# Patient Record
Sex: MALE | Race: WHITE | NOT HISPANIC OR LATINO | Employment: UNEMPLOYED | URBAN - METROPOLITAN AREA
[De-identification: names, ages, dates, MRNs, and addresses within clinical notes are randomized per-mention and may not be internally consistent; named-entity substitution may affect disease eponyms.]

---

## 2018-09-25 ENCOUNTER — OFFICE VISIT (OUTPATIENT)
Dept: CARDIAC SURGERY | Facility: CLINIC | Age: 63
End: 2018-09-25
Payer: COMMERCIAL

## 2018-09-25 VITALS
TEMPERATURE: 97.8 F | SYSTOLIC BLOOD PRESSURE: 145 MMHG | OXYGEN SATURATION: 99 % | DIASTOLIC BLOOD PRESSURE: 63 MMHG | BODY MASS INDEX: 23.6 KG/M2 | WEIGHT: 174.2 LBS | HEART RATE: 65 BPM | HEIGHT: 72 IN

## 2018-09-25 DIAGNOSIS — K44.9 HIATAL HERNIA: Primary | ICD-10-CM

## 2018-09-25 PROCEDURE — 99205 OFFICE O/P NEW HI 60 MIN: CPT | Performed by: THORACIC SURGERY (CARDIOTHORACIC VASCULAR SURGERY)

## 2018-09-25 RX ORDER — MELATONIN
1000 DAILY
COMMUNITY

## 2018-09-25 NOTE — PROGRESS NOTES
Thoracic Consult  Assessment/Plan:    Hiatal hernia  Mr Jc Jc does have some GI symptoms, although it is less likely to be related to his small hiatal hernia  If he would like to undergo testing to further evaluate his hernia and GERD, this would include esophageal manometry and 24h pH probe  Otherwise, he should continue to follow with his GI physician, as his symptoms may be related to IBS  We have agreed that he will continue on the PPI for at least 6 weeks to see if it improves his symptoms and call our office if he would like to undergo further testing  Diagnoses and all orders for this visit:    Hiatal hernia    Other orders  -     cholecalciferol (VITAMIN D3) 1,000 units tablet; Take 1,000 Units by mouth daily          Thoracic History   Diagnosis: Hiatal hernia    Procedures/Surgeries:    Pathology:    Adjuvant Therapy:          Patient ID: Gino Márquez is a 61 y o  male  HPI    Mr Jc Jc is a 62 yo male referred by Dr Jakub Saleem for a hiatal hernia  He underwent an EGD on 8/16/18 for epigastric abdominal pain by Dr Joe Bennett at [facility]  This demonstrated a small hiatal hernia and zline at 36 cm from incisors  There was also non bleeding erosive gastropathy  His biopsies were negative for intestinal metaplasia or Stoddard's esophagus  His GE junction biopsy was consistent with mild active inflammation, consistent with GERD  On discussion, he was previously treated several years ago for H  Pylori, for 9 months  Over the next several years, he continued with epigastric and right upper quadrant, along with abdominal bloating  He recently started Prilosec, which seemed to relieve the abdominal bloating somewhat  His epigastric pain is constant and doesn't seem to be related to food  RUQ is constant, but epigastric pain is sometimes after eating  He has experienced some liquid/solid regurgitation over the past two weeks   He denies any significant heartburn, odynophagia, dysphagia, nausea, vomiting, globus sensation, or diarrhea  He was a previous 10 ppy smoker, quit 20 yrs ago  He does not recall undergoing esophageal manometry or 24 pH probe testing  He might have undergone a barium swallow within the past 5 years, but not recently  Past Medical History:   Diagnosis Date    Ganglion     Hiatal hernia     Hypogonadism in male     Lump or mass in breast       Past Surgical History:   Procedure Laterality Date    BREAST BIOPSY      OR EXCIS TENDN/CAPSULE LESN,FOOT Left 12/2/2016    Procedure: EXCISION FIBROMA;  Surgeon: Boris Crisostomo DPM;  Location: [facility];  Service: Podiatry   lipoma removed from back and breast    Family History   Problem Relation Age of Onset    No Known Problems Mother     No Known Problems Father       Social History     Social History    Marital status: Single     Spouse name: N/A    Number of children: N/A    Years of education: N/A     Occupational History    Not on file  Social History Main Topics    Smoking status: Former Smoker     Packs/day: 0 50     Years: 20 00     Types: Cigarettes     Quit date: 1998    Smokeless tobacco: Never Used      Comment: socially    Alcohol use Yes      Comment: Rare     Drug use: No    Sexual activity: Not on file     Other Topics Concern    Not on file     Social History Narrative    No narrative on file      Review of Systems   Constitutional: Positive for chills and fever  Negative for fatigue and unexpected weight change  HENT: Positive for congestion and rhinorrhea  Negative for sore throat, trouble swallowing and voice change  Respiratory: Negative for cough and shortness of breath  Cardiovascular: Negative for chest pain  Gastrointestinal: Positive for abdominal distention, abdominal pain and constipation  Negative for diarrhea, nausea and vomiting  Musculoskeletal: Negative for back pain and gait problem  Allergic/Immunologic: Negative for food allergies  Neurological: Negative for syncope and headaches  Hematological: Negative for adenopathy  Psychiatric/Behavioral: Negative for confusion  Objective:   Physical Exam   Constitutional: He is oriented to person, place, and time  He appears well-developed  HENT:   Head: Normocephalic and atraumatic  Eyes: EOM are normal  Pupils are equal, round, and reactive to light  No scleral icterus  Neck: Normal range of motion  Neck supple  Cardiovascular: Normal rate, regular rhythm and normal heart sounds  Pulmonary/Chest: Effort normal  No respiratory distress  He has no wheezes  Abdominal: Soft  Bowel sounds are normal  He exhibits no distension  Musculoskeletal: Normal range of motion  Lymphadenopathy:     He has no cervical adenopathy  Neurological: He is alert and oriented to person, place, and time  No cranial nerve deficit  Skin: Skin is warm and dry  He is not diaphoretic  No pallor  Psychiatric: He has a normal mood and affect  His behavior is normal    Vitals reviewed      /63 (BP Location: Left arm, Patient Position: Sitting, Cuff Size: Adult)   Pulse 65   Temp 97 8 °F (36 6 °C)   Ht 6' (1 829 m)   Wt 79 kg (174 lb 3 2 oz)   SpO2 99%   BMI 23 63 kg/m²

## 2018-09-25 NOTE — ASSESSMENT & PLAN NOTE
Mr Polo Larios does have some GI symptoms, although it is less likely to be related to his small hiatal hernia  If he would like to undergo testing to further evaluate his hernia and GERD, this would include esophageal manometry and 24h pH probe  Otherwise, he should continue to follow with his GI physician, as his symptoms may be related to IBS  We have agreed that he will continue on the PPI for at least 6 weeks to see if it improves his symptoms and call our office if he would like to undergo further testing

## 2018-09-25 NOTE — PROGRESS NOTES
Thoracic Follow-Up  Assessment/Plan:    No problem-specific Assessment & Plan notes found for this encounter  {Assess/PlanSmartLinks:02425}      Thoracic History             Patient ID: Pepe Lindo is a 61 y o  male  HPI        Review of Systems      Objective:   Physical ExamThere were no vitals taken for this visit

## 2021-04-08 ENCOUNTER — OFFICE VISIT (OUTPATIENT)
Dept: INTERNAL MEDICINE CLINIC | Facility: CLINIC | Age: 66
End: 2021-04-08
Payer: COMMERCIAL

## 2021-04-08 VITALS
HEIGHT: 72 IN | SYSTOLIC BLOOD PRESSURE: 122 MMHG | OXYGEN SATURATION: 99 % | HEART RATE: 70 BPM | DIASTOLIC BLOOD PRESSURE: 72 MMHG | WEIGHT: 169.2 LBS | BODY MASS INDEX: 22.92 KG/M2 | RESPIRATION RATE: 16 BRPM

## 2021-04-08 DIAGNOSIS — Z13.29 SCREENING FOR THYROID DISORDER: ICD-10-CM

## 2021-04-08 DIAGNOSIS — Z13.1 SCREENING FOR DIABETES MELLITUS: ICD-10-CM

## 2021-04-08 DIAGNOSIS — Z81.8 FAMILY HISTORY OF FIRST DEGREE RELATIVE WITH DEMENTIA: ICD-10-CM

## 2021-04-08 DIAGNOSIS — E29.1 HYPOGONADISM IN MALE: ICD-10-CM

## 2021-04-08 DIAGNOSIS — Z12.5 SCREENING FOR PROSTATE CANCER: ICD-10-CM

## 2021-04-08 DIAGNOSIS — D17.1 LIPOMA OF TORSO: Primary | ICD-10-CM

## 2021-04-08 DIAGNOSIS — I65.23 CAROTID STENOSIS, ASYMPTOMATIC, BILATERAL: ICD-10-CM

## 2021-04-08 DIAGNOSIS — N40.0 BENIGN PROSTATIC HYPERPLASIA WITHOUT LOWER URINARY TRACT SYMPTOMS: ICD-10-CM

## 2021-04-08 DIAGNOSIS — E55.9 VITAMIN D DEFICIENCY: ICD-10-CM

## 2021-04-08 DIAGNOSIS — Z13.220 SCREENING FOR HYPERLIPIDEMIA: ICD-10-CM

## 2021-04-08 PROCEDURE — 3725F SCREEN DEPRESSION PERFORMED: CPT | Performed by: INTERNAL MEDICINE

## 2021-04-08 PROCEDURE — 3288F FALL RISK ASSESSMENT DOCD: CPT | Performed by: INTERNAL MEDICINE

## 2021-04-08 PROCEDURE — 99204 OFFICE O/P NEW MOD 45 MIN: CPT | Performed by: INTERNAL MEDICINE

## 2021-04-08 PROCEDURE — 1101F PT FALLS ASSESS-DOCD LE1/YR: CPT | Performed by: INTERNAL MEDICINE

## 2021-04-08 RX ORDER — MAGNESIUM GLUCONATE 30 MG(550)
30 TABLET ORAL DAILY
COMMUNITY

## 2021-04-08 NOTE — PROGRESS NOTES
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  -works full time and getting burned out since other family members are not helping  -is interested in home services and social work consulted    #Hearing Loss  -worse in right ear  -has hearing aid and will await replacement    #Health Maintenance  -routine labs and followup 3 months  -owns a financial company  -defers colonoscopy and EGD due 7 colonoscopies in past and 5 EGD in past      Addendum 4211 patient's testosterone level was 430  He reports that he feels fatigued  He states that his normal baseline is typically around 750  He previously was on testosterone 200 mg injection once a month  He states that this helped him and then he discontinued it for short time period because his testosterone normalize  States that he would like to hold off for now however will contact us in 1 month his symptoms of fatigue continue to persist   Patient was informed risk of prostate cancer with testosterone therapy as well as coronary artery risk  He is aware of this  Addendum 4/27/21 lipoma excised  No problem-specific Assessment & Plan notes found for this encounter  Diagnoses and all orders for this visit:    Lipoma of torso  -     CBC and differential  -     Comprehensive metabolic panel  -     Ambulatory referral to General Surgery;  Future    Family history of first degree relative with dementia  -     CBC and differential  -     Comprehensive metabolic panel  -     Ambulatory referral to social work care management program    Carotid stenosis, asymptomatic, bilateral  -     CBC and differential  -     Comprehensive metabolic panel  -     VAS carotid complete study; Future    Screening for hyperlipidemia  -     CBC and differential  -     Comprehensive metabolic panel  -     Lipid Panel With Direct LDL    Screening for thyroid disorder  -     CBC and differential  -     TSH, 3rd generation with Free T4 reflex  -     Comprehensive metabolic panel    Screening for prostate cancer  -     CBC and differential  -     Comprehensive metabolic panel    Vitamin D deficiency  -     CBC and differential  -     Vitamin D 25 hydroxy  -     Comprehensive metabolic panel    Benign prostatic hyperplasia without lower urinary tract symptoms  -     PSA, total and free    Screening for diabetes mellitus  -     Hemoglobin A1C    Hypogonadism in male  -     Testosterone, free, total    Other orders  -     Magnesium Gluconate 550 MG TABS; Take 30 mg by mouth daily  -     co-enzyme Q-10 30 MG capsule; Take 30 mg by mouth 3 (three) times a day            Current Outpatient Medications:     cholecalciferol (VITAMIN D3) 1,000 units tablet, Take 1,000 Units by mouth daily, Disp: , Rfl:     co-enzyme Q-10 30 MG capsule, Take 30 mg by mouth 3 (three) times a day, Disp: , Rfl:     Magnesium Gluconate 550 MG TABS, Take 30 mg by mouth daily, Disp: , Rfl:     Multiple Vitamin (MULTIVITAMIN) capsule, Take 1 capsule by mouth daily, Disp: , Rfl:     Subjective:      Patient ID: Vickidestini Arenas is a 72 y o  male  HPI     Patient presents for patient visit  Reports that he has been under lot of stress since he has been taking care of his mother who has Alzheimer's  Reports that it is difficult because he is working full-time  He would like services to help with caring for his mother and we will refer him to social work  States that he has been feeling fatigued for the past several   We will check a testosterone as well as thyroid and routine labs    Reports that he had low testosterone in past and had been on a supplement however his testosterone and rebounded and he discontinued it  Patient reports he also has a lipoma over his left flank  He is interested having it removed since it is causing him discomfort  We will refer him to surgery for further evaluation  Patient also reports that he has hearing loss over the right ear  This is chronic  He is currently awaiting hearing aids  Patient reports that he is up-to-date on his colonoscopy  He is due for carotid ultrasound we obtain this  He defers a stress test   Patient was former smoker however quit over 20 years ago  He denies any alcohol or drug use  He is up-to-date on the COVID vaccine and will receive his 2nd dose over the next couple weeks  Reports that his sister has lung cancer and mom has emphysema and both mom and dad hypertension  He reports that his father had coronary artery disease however he was a smoker and worked in the [address] [place] [address] Node[address]  Patient will return to care in 3 months with labs  The following portions of the patient's history were reviewed and updated as appropriate: allergies, current medications, past family history, past medical history, past social history, past surgical history and problem list     Review of Systems   Constitutional: Positive for fatigue  Negative for activity change, appetite change and fever  HENT: Negative for congestion, ear pain, hearing loss, sore throat and tinnitus  Eyes: Negative for photophobia, pain and visual disturbance  Respiratory: Negative for cough, shortness of breath and wheezing  Cardiovascular: Negative for chest pain and leg swelling  Gastrointestinal: Negative for abdominal distention, abdominal pain, constipation, diarrhea, nausea and vomiting  Genitourinary: Negative for difficulty urinating, frequency and hematuria     Musculoskeletal: Negative for arthralgias, back pain, gait problem, joint swelling, myalgias, neck pain and neck stiffness  Skin: Positive for rash (lump on left back)  Negative for color change, pallor and wound  Neurological: Negative for dizziness, tremors, numbness and headaches  Hematological: Does not bruise/bleed easily  Psychiatric/Behavioral: Positive for dysphoric mood  Objective:      /72   Pulse 70   Resp 16   Ht 6' (1 829 m)   Wt 76 7 kg (169 lb 3 2 oz)   SpO2 99%   BMI 22 95 kg/m²          Physical Exam  Vitals signs reviewed  Constitutional:       Appearance: Normal appearance  He is well-developed  HENT:      Head: Normocephalic and atraumatic  Right Ear: Tympanic membrane, ear canal and external ear normal  There is no impacted cerumen  Left Ear: Tympanic membrane, ear canal and external ear normal  There is no impacted cerumen  Eyes:      Conjunctiva/sclera: Conjunctivae normal       Pupils: Pupils are equal, round, and reactive to light  Neck:      Musculoskeletal: Normal range of motion and neck supple  Thyroid: No thyromegaly  Vascular: No JVD  Cardiovascular:      Rate and Rhythm: Normal rate and regular rhythm  Pulses: Normal pulses  Heart sounds: Normal heart sounds  No murmur  Pulmonary:      Effort: Pulmonary effort is normal  No respiratory distress  Breath sounds: Normal breath sounds  No stridor  No wheezing, rhonchi or rales  Abdominal:      General: Abdomen is flat  Bowel sounds are normal  There is no distension  Palpations: Abdomen is soft  There is no mass  Tenderness: There is no abdominal tenderness  There is no guarding or rebound  Musculoskeletal: Normal range of motion  General: Tenderness (left flank lipoma) present  No deformity  Right lower leg: No edema  Left lower leg: No edema  Lymphadenopathy:      Cervical: No cervical adenopathy  Skin:     General: Skin is warm and dry  Findings: No erythema or rash     Neurological:      Mental Status: He is alert and oriented to person, place, and time  Mental status is at baseline  Deep Tendon Reflexes: Reflexes are normal and symmetric  Psychiatric:         Behavior: Behavior normal          Thought Content: Thought content normal          Judgment: Judgment normal       Comments: Depressed mood           This note was completed in part utilizing m-[employer] fluency direct voice recognition software  Grammatical errors, random word insertion, spelling mistakes, and incomplete sentences may be an occasional consequence of the system secondary to software limitations, ambient noise and hardware issues  At the time of dictation, efforts were made to edit, clarify and /or correct errors  Please read the chart carefully and recognize, using context, where substitutions have occurred  If you have any questions or concerns about the context, text or information contained within the body of this dictation, please contact myself, the provider, for further clarification

## 2021-04-09 ENCOUNTER — PATIENT OUTREACH (OUTPATIENT)
Dept: INTERNAL MEDICINE CLINIC | Facility: CLINIC | Age: 66
End: 2021-04-09

## 2021-04-09 NOTE — PROGRESS NOTES
Referral received from Dr Anjel Jeter with request for 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

## 2021-04-13 ENCOUNTER — PATIENT OUTREACH (OUTPATIENT)
Dept: INTERNAL MEDICINE CLINIC | Facility: CLINIC | Age: 66
End: 2021-04-13

## 2021-04-13 NOTE — PROGRESS NOTES
Voicemail received from patient in response to [facility] SW's initial outreach  [facility] SW to return call to patient  Outreached patient to further assess needs (patient's mother has dementia and is in need of HHA assistance per Dr Madhuri Bill)  Patient stated he had recent PCP appt but AVS had incorrect insurance information listed (patient had [id number] but now has [employer])  [facility] SW confirmed for patient that insurance information has been updated to show new insurance plan ([facility])  Patient is due for lab work and plans to go to [facility] for this; patient has questions regarding script for VAS Carotid and needed Ultrasound  Will send message to PCP office clinical staff and request for staff to outreach patient to further clarify  Patient also confirmed that he is in need of resources for his mother  Patient's mother is not a [facility] patient; sees [facility]  Patient's mother lives with him; she has dementia  Patient works full-time and is not home during the day; checks on his mom during his lunch break  Patient stated his mother does fine on her own on good days  Patient's mom uses cane to ambulate, had fall 2 years ago that led to progressive decline, has history of VNA, does not drive (patient transports to appts), is blind in 1 eye and deaf in 1 ear (has hearing aid in other ear), patient cooks 90% of meals, patient's mom prepares her own breakfast (fruit or egg), patient's mom dresses herself, and does her own medications through pill box but patient monitors  Patient does not want to place patient in facility for long-term care but expressed interest in VNA for home PT/OT  [facility] SW explained that patient's mother's PCP office will need to place this HHC order directly; [facility] SW encouraged patient to outreach his mother's PCP office to further discuss  Patient also expressed possible interest in in-home services 1-2 times per week and food services to help with meals    Patient's mother does not have assets per patient  [facility] SW will mail patient resources to review for any future needs (Waiver, [address] list, [employer] information, [employer] information, [employer], Meals on [employer], Progression, and [address])  Patient will call [facility] SW with any questions

## 2021-04-19 DIAGNOSIS — E78.2 MODERATE MIXED HYPERLIPIDEMIA NOT REQUIRING STATIN THERAPY: Primary | ICD-10-CM

## 2021-04-20 ENCOUNTER — TELEPHONE (OUTPATIENT)
Dept: INTERNAL MEDICINE CLINIC | Facility: CLINIC | Age: 66
End: 2021-04-20

## 2021-04-20 LAB
25(OH)D3 SERPL-MCNC: 27 NG/ML (ref 30–100)
ALBUMIN SERPL-MCNC: 4.3 G/DL (ref 3.6–5.1)
ALBUMIN/GLOB SERPL: 1.5 (CALC) (ref 1–2.5)
ALP SERPL-CCNC: 83 U/L (ref 35–144)
ALT SERPL-CCNC: 18 U/L (ref 9–46)
AST SERPL-CCNC: 17 U/L (ref 10–35)
BASOPHILS # BLD AUTO: 71 CELLS/UL (ref 0–200)
BASOPHILS NFR BLD AUTO: 1 %
BILIRUB SERPL-MCNC: 0.4 MG/DL (ref 0.2–1.2)
BUN SERPL-MCNC: 28 MG/DL (ref 7–25)
BUN/CREAT SERPL: 25 (CALC) (ref 6–22)
CALCIUM SERPL-MCNC: 9.4 MG/DL (ref 8.6–10.3)
CHLORIDE SERPL-SCNC: 104 MMOL/L (ref 98–110)
CHOLEST SERPL-MCNC: 189 MG/DL
CHOLEST/HDLC SERPL: 4.5 (CALC)
CO2 SERPL-SCNC: 27 MMOL/L (ref 20–32)
CREAT SERPL-MCNC: 1.1 MG/DL (ref 0.7–1.25)
EOSINOPHIL # BLD AUTO: 128 CELLS/UL (ref 15–500)
EOSINOPHIL NFR BLD AUTO: 1.8 %
ERYTHROCYTE [DISTWIDTH] IN BLOOD BY AUTOMATED COUNT: 13.6 % (ref 11–15)
GLOBULIN SER CALC-MCNC: 2.8 G/DL (CALC) (ref 1.9–3.7)
GLUCOSE SERPL-MCNC: 106 MG/DL (ref 65–99)
HCT VFR BLD AUTO: 50.1 % (ref 38.5–50)
HDLC SERPL-MCNC: 42 MG/DL
HGB BLD-MCNC: 17 G/DL (ref 13.2–17.1)
LDLC SERPL CALC-MCNC: 118 MG/DL (CALC)
LYMPHOCYTES # BLD AUTO: 1590 CELLS/UL (ref 850–3900)
LYMPHOCYTES NFR BLD AUTO: 22.4 %
MCH RBC QN AUTO: 32 PG (ref 27–33)
MCHC RBC AUTO-ENTMCNC: 33.9 G/DL (ref 32–36)
MCV RBC AUTO: 94.2 FL (ref 80–100)
MONOCYTES # BLD AUTO: 795 CELLS/UL (ref 200–950)
MONOCYTES NFR BLD AUTO: 11.2 %
NEUTROPHILS # BLD AUTO: 4516 CELLS/UL (ref 1500–7800)
NEUTROPHILS NFR BLD AUTO: 63.6 %
NONHDLC SERPL-MCNC: 147 MG/DL (CALC)
PLATELET # BLD AUTO: 237 THOUSAND/UL (ref 140–400)
PMV BLD REES-ECKER: 11.1 FL (ref 7.5–12.5)
POTASSIUM SERPL-SCNC: 4.5 MMOL/L (ref 3.5–5.3)
PROT SERPL-MCNC: 7.1 G/DL (ref 6.1–8.1)
PSA FREE MFR SERPL: 20 % (CALC)
PSA FREE SERPL-MCNC: 0.1 NG/ML
PSA SERPL-MCNC: 0.5 NG/ML
RBC # BLD AUTO: 5.32 MILLION/UL (ref 4.2–5.8)
SL AMB EGFR AFRICAN AMERICAN: 81 ML/MIN/1.73M2
SL AMB EGFR NON AFRICAN AMERICAN: 70 ML/MIN/1.73M2
SODIUM SERPL-SCNC: 138 MMOL/L (ref 135–146)
TESTOST FREE SERPL-MCNC: 38.3 PG/ML (ref 35–155)
TESTOST SERPL-MCNC: 430 NG/DL (ref 250–1100)
TRIGL SERPL-MCNC: 174 MG/DL
TSH SERPL-ACNC: 2.5 MIU/L (ref 0.4–4.5)
WBC # BLD AUTO: 7.1 THOUSAND/UL (ref 3.8–10.8)

## 2021-04-20 NOTE — TELEPHONE ENCOUNTER
Called patient and gave results  Ge didn't want to make an appointment until his other test results were back

## 2021-04-20 NOTE — TELEPHONE ENCOUNTER
----- Message from Annamae Lanes, DO sent at 4/19/2021  5:47 PM EDT -----  Please let patient know his labs look good  PSA was normal, no issues with prostate cancer, his LDL cholesterol was 118 and triglyceride was 174  We will need him to diet and exercise and cut back on alcohol and fatty foods  We will repeat his labs i  n 6 months and he should come back at that time  His testosterone is pending

## 2021-04-20 NOTE — TELEPHONE ENCOUNTER
Please let patient know his testosterone was normal at 430      PER DR FREEDMAN     Called patient and left messsage asking him to call us back so we can set up appointment

## 2021-04-21 ENCOUNTER — TELEPHONE (OUTPATIENT)
Dept: INTERNAL MEDICINE CLINIC | Facility: CLINIC | Age: 66
End: 2021-04-21

## 2021-04-21 DIAGNOSIS — E29.1 HYPOGONADISM IN MALE: Primary | ICD-10-CM

## 2021-04-21 NOTE — TELEPHONE ENCOUNTER
Gr[facility] Braswell would like to talk to you about his testosterone results  His level shows 430 done on 4/16  He says his level is usually in the 700 range and would like to speak with you

## 2021-04-26 ENCOUNTER — CONSULT (OUTPATIENT)
Dept: SURGERY | Facility: CLINIC | Age: 66
End: 2021-04-26
Payer: COMMERCIAL

## 2021-04-26 VITALS — HEIGHT: 72 IN | WEIGHT: 170 LBS | BODY MASS INDEX: 23.03 KG/M2

## 2021-04-26 DIAGNOSIS — D17.1 LIPOMA OF TORSO: ICD-10-CM

## 2021-04-26 PROCEDURE — 99202 OFFICE O/P NEW SF 15 MIN: CPT | Performed by: SURGERY

## 2021-04-26 PROCEDURE — 3008F BODY MASS INDEX DOCD: CPT | Performed by: SURGERY

## 2021-04-26 PROCEDURE — 1160F RVW MEDS BY RX/DR IN RCRD: CPT | Performed by: SURGERY

## 2021-04-26 PROCEDURE — 88304 TISSUE EXAM BY PATHOLOGIST: CPT | Performed by: PATHOLOGY

## 2021-04-26 PROCEDURE — 1036F TOBACCO NON-USER: CPT | Performed by: SURGERY

## 2021-04-26 PROCEDURE — 11404 EXC TR-EXT B9+MARG 3.1-4 CM: CPT | Performed by: SURGERY

## 2021-04-26 NOTE — PROGRESS NOTES
Assessment/Plan:  Patient presents with subcutaneous mass over the left  Flank  Is been enlarging in size  He desires excision for definitive diagnosis  After consultation he was turned to the  Right lateral decubitus position  The area was prepped with alcohol and draped in a sterile fashion  1% lidocaine with epinephrine was infiltrated circumferentially  After consultation incision was made and carried down through skin and subcutaneous tissue  The lipoma had replaced most of the  Subcutaneous soft tissue down to the musculature  This mass was then excised  It measures 3 5 by 2 5 cm  Margins are 0  The wound was reapproximated with 3 0 Vicryl subcutaneous and 4 Monocryl suture  Dermabond was then applied with Steri-Strips  He tolerated this procedure well  Wound care instructions provided  Diagnoses and all orders for this visit:    Lipoma of torso  -     Ambulatory referral to General Surgery  -     Tissue Exam; Future  -     Tissue Exam    Other orders  -     Skin excision        Subjective:      Patient ID: Darene Gilford is a 72 y o  male  Patient presents for evaluation of a lump on his left flank  States he has had the lump for 1 1/2 years and was scheduled to have the lump removed  The lump disappeared and surgery was cancelled  He noticed the lump again 3 months ago  Denies pain or size change  The following portions of the patient's history were reviewed and updated as appropriate:     He  has a past medical history of Ganglion, Hiatal hernia, Hypogonadism in male, and Lump or mass in breast   He  has a past surgical history that includes Breast biopsy and pr excis tendn/capsule lesn,foot (Left, 12/2/2016)  His family history includes COPD in his father and mother; Coronary artery disease in his father; Dementia in his mother; Hypertension in his father and mother; Lung cancer in his sister  He  reports that he quit smoking about 23 years ago   His smoking use included cigarettes  He has a 10 00 pack-year smoking history  He has never used smokeless tobacco  He reports previous alcohol use  He reports that he does not use drugs  Current Outpatient Medications   Medication Sig Dispense Refill    cholecalciferol (VITAMIN D3) 1,000 units tablet Take 1,000 Units by mouth daily      co-enzyme Q-10 30 MG capsule Take 30 mg by mouth 3 (three) times a day      Magnesium Gluconate 550 MG TABS Take 30 mg by mouth daily      Multiple Vitamin (MULTIVITAMIN) capsule Take 1 capsule by mouth daily       No current facility-administered medications for this visit  He has No Known Allergies       Review of Systems   HENT: Positive for hearing loss  All other systems reviewed and are negative  Objective:      Ht 6' (1 829 m)   Wt 77 1 kg (170 lb)   BMI 23 06 kg/m²          Physical Exam  Skin excision    Date/Time: 4/26/2021 6:26 PM  Performed by: Nolan Frey MD  Authorized by: Nolan Frey MD   Universal Protocol:  Consent: Verbal consent obtained  Patient understanding: patient states understanding of the procedure being performed  Patient identity confirmed: verbally with patient      Procedure Details - Skin Excision:     Number of Lesions:  1  Lesion 1:     Body area:  Trunk    Trunk location:  L flank       Final defect size (mm):  35    Malignancy: benign lesion       Repair Comments: After consultation incision was made and carried down through skin and subcutaneous tissue  The lipoma had replaced most of the  Subcutaneous soft tissue down to the musculature  This mass was then excised  It measures 3 5 by 2 5 cm  Margins are 0  The wound was reapproximated with 3 0 Vicryl subcutaneous and 4 Monocryl suture  Dermabond was then applied with Steri-Strips  He tolerated this procedure well  Wound care instructions provided

## 2021-05-03 ENCOUNTER — OFFICE VISIT (OUTPATIENT)
Dept: SURGERY | Facility: CLINIC | Age: 66
End: 2021-05-03

## 2021-05-03 DIAGNOSIS — D17.1 LIPOMA OF TORSO: Primary | ICD-10-CM

## 2021-05-03 PROCEDURE — 99024 POSTOP FOLLOW-UP VISIT: CPT | Performed by: SURGERY

## 2021-05-03 NOTE — PROGRESS NOTES
Assessment/Plan:  Patient presents for follow-up visit  He underwent excision of the lipoma over the left flank  Pathology is benign  He returns today for follow-up visit  Incisions clean and healing well  Is no evidence for infection  Questions were answered  No follow-up visit is required  Diagnoses and all orders for this visit:    Lipoma of torso        Subjective:      Patient ID: Nette Navarrete is a 72 y o  male  Patient presents for follow up on SQ mass over his left flank  S/P excision of SQ mass over the left flank 4/26/2021  Final Diagnosis   A  Soft tissue, left flank, excision:        - Benign adipose tissue, consistent with lipoma  - No dysplasia or malignancy is identified  The following portions of the patient's history were reviewed and updated as appropriate:     He  has a past medical history of Ganglion, Hiatal hernia, Hypogonadism in male, and Lump or mass in breast   He  has a past surgical history that includes Breast biopsy and pr excis tendn/capsule lesn,foot (Left, 12/2/2016)  His family history includes COPD in his father and mother; Coronary artery disease in his father; Dementia in his mother; Hypertension in his father and mother; Lung cancer in his sister  He  reports that he quit smoking about 23 years ago  His smoking use included cigarettes  He has a 10 00 pack-year smoking history  He has never used smokeless tobacco  He reports previous alcohol use  He reports that he does not use drugs  Current Outpatient Medications   Medication Sig Dispense Refill    cholecalciferol (VITAMIN D3) 1,000 units tablet Take 1,000 Units by mouth daily      co-enzyme Q-10 30 MG capsule Take 30 mg by mouth 3 (three) times a day      Magnesium Gluconate 550 MG TABS Take 30 mg by mouth daily      Multiple Vitamin (MULTIVITAMIN) capsule Take 1 capsule by mouth daily       No current facility-administered medications for this visit        He has No Known Allergies       Review of Systems   HENT: Positive for hearing loss  All other systems reviewed and are negative  Objective: There were no vitals taken for this visit  Physical Exam  incision is clean and healing well  No evidence for infection

## 2021-06-02 ENCOUNTER — HOSPITAL ENCOUNTER (OUTPATIENT)
Dept: NON INVASIVE DIAGNOSTICS | Facility: CLINIC | Age: 66
Discharge: HOME | End: 2021-06-02
Payer: COMMERCIAL

## 2021-06-02 DIAGNOSIS — I65.23 CAROTID STENOSIS, ASYMPTOMATIC, BILATERAL: ICD-10-CM

## 2021-06-02 PROCEDURE — 93880 EXTRACRANIAL BILAT STUDY: CPT | Performed by: SURGERY

## 2021-06-02 PROCEDURE — 93880 EXTRACRANIAL BILAT STUDY: CPT

## 2021-06-07 ENCOUNTER — TELEPHONE (OUTPATIENT)
Dept: INTERNAL MEDICINE CLINIC | Facility: CLINIC | Age: 66
End: 2021-06-07

## 2021-06-07 NOTE — TELEPHONE ENCOUNTER
----- Message from Erika Blanco DO sent at 6/3/2021  3:03 PM EDT -----  Please let patient know his carotid artery was fine  He has mild cholesterol deposit there in both sides but it is less than 50%, all good news

## 2021-08-14 LAB
ALBUMIN SERPL-MCNC: 4.1 G/DL (ref 3.6–5.1)
ALBUMIN/GLOB SERPL: 1.5 (CALC) (ref 1–2.5)
ALP SERPL-CCNC: 69 U/L (ref 35–144)
ALT SERPL-CCNC: 17 U/L (ref 9–46)
AST SERPL-CCNC: 16 U/L (ref 10–35)
BILIRUB SERPL-MCNC: 0.5 MG/DL (ref 0.2–1.2)
BUN SERPL-MCNC: 22 MG/DL (ref 7–25)
BUN/CREAT SERPL: ABNORMAL (CALC) (ref 6–22)
CALCIUM SERPL-MCNC: 9.4 MG/DL (ref 8.6–10.3)
CHLORIDE SERPL-SCNC: 105 MMOL/L (ref 98–110)
CHOLEST SERPL-MCNC: 192 MG/DL
CHOLEST/HDLC SERPL: 4.7 (CALC)
CO2 SERPL-SCNC: 29 MMOL/L (ref 20–32)
CREAT SERPL-MCNC: 1.03 MG/DL (ref 0.7–1.25)
GLOBULIN SER CALC-MCNC: 2.8 G/DL (CALC) (ref 1.9–3.7)
GLUCOSE SERPL-MCNC: 111 MG/DL (ref 65–99)
HDLC SERPL-MCNC: 41 MG/DL
LDLC SERPL CALC-MCNC: 120 MG/DL (CALC)
NONHDLC SERPL-MCNC: 151 MG/DL (CALC)
POTASSIUM SERPL-SCNC: 4.1 MMOL/L (ref 3.5–5.3)
PROT SERPL-MCNC: 6.9 G/DL (ref 6.1–8.1)
SL AMB EGFR AFRICAN AMERICAN: 87 ML/MIN/1.73M2
SL AMB EGFR NON AFRICAN AMERICAN: 75 ML/MIN/1.73M2
SODIUM SERPL-SCNC: 140 MMOL/L (ref 135–146)
TESTOST FREE SERPL-MCNC: 58.4 PG/ML (ref 35–155)
TESTOST SERPL-MCNC: 578 NG/DL (ref 250–1100)
TRIGL SERPL-MCNC: 195 MG/DL

## 2021-08-20 ENCOUNTER — RA CDI HCC (OUTPATIENT)
Dept: OTHER | Facility: HOSPITAL | Age: 66
End: 2021-08-20

## 2021-08-20 NOTE — PROGRESS NOTES
[facility]       Chart reviewed, no opportunity found: CHART REVIEWED, NO OPPORTUNITY FOUND                        Patients insurance company: [employer]

## 2021-08-27 ENCOUNTER — OFFICE VISIT (OUTPATIENT)
Dept: INTERNAL MEDICINE CLINIC | Facility: CLINIC | Age: 66
End: 2021-08-27
Payer: COMMERCIAL

## 2021-08-27 VITALS
WEIGHT: 172.6 LBS | HEART RATE: 69 BPM | HEIGHT: 72 IN | OXYGEN SATURATION: 99 % | BODY MASS INDEX: 23.38 KG/M2 | RESPIRATION RATE: 16 BRPM | DIASTOLIC BLOOD PRESSURE: 84 MMHG | SYSTOLIC BLOOD PRESSURE: 126 MMHG

## 2021-08-27 DIAGNOSIS — E29.1 HYPOGONADISM IN MALE: Primary | ICD-10-CM

## 2021-08-27 DIAGNOSIS — E78.2 MODERATE MIXED HYPERLIPIDEMIA NOT REQUIRING STATIN THERAPY: ICD-10-CM

## 2021-08-27 DIAGNOSIS — Z20.822 ENCOUNTER FOR LABORATORY TESTING FOR COVID-19 VIRUS: ICD-10-CM

## 2021-08-27 DIAGNOSIS — Z00.00 MEDICARE ANNUAL WELLNESS VISIT, INITIAL: ICD-10-CM

## 2021-08-27 PROCEDURE — 1170F FXNL STATUS ASSESSED: CPT | Performed by: INTERNAL MEDICINE

## 2021-08-27 PROCEDURE — 1125F AMNT PAIN NOTED PAIN PRSNT: CPT | Performed by: INTERNAL MEDICINE

## 2021-08-27 PROCEDURE — G0438 PPPS, INITIAL VISIT: HCPCS | Performed by: INTERNAL MEDICINE

## 2021-08-27 PROCEDURE — 3725F SCREEN DEPRESSION PERFORMED: CPT | Performed by: INTERNAL MEDICINE

## 2021-08-27 PROCEDURE — 99214 OFFICE O/P EST MOD 30 MIN: CPT | Performed by: INTERNAL MEDICINE

## 2021-08-27 PROCEDURE — 1036F TOBACCO NON-USER: CPT | Performed by: INTERNAL MEDICINE

## 2021-08-27 PROCEDURE — 3008F BODY MASS INDEX DOCD: CPT | Performed by: INTERNAL MEDICINE

## 2021-08-27 PROCEDURE — 3288F FALL RISK ASSESSMENT DOCD: CPT | Performed by: INTERNAL MEDICINE

## 2021-08-27 PROCEDURE — 1160F RVW MEDS BY RX/DR IN RCRD: CPT | Performed by: INTERNAL MEDICINE

## 2021-08-27 RX ORDER — TESTOSTERONE 16.2 MG/G
81 GEL TRANSDERMAL EVERY MORNING
Qty: 75 G | Refills: 1 | Status: SHIPPED | OUTPATIENT
Start: 2021-08-27 | End: 2022-02-24

## 2021-08-27 NOTE — PROGRESS NOTES
Assessment and Plan:     Problem List Items Addressed This Visit     None      Visit Diagnoses     Hypogonadism in male    -  Primary    Relevant Medications    testosterone (AndroGel Pump) 1 62 % TD gel pump    Other Relevant Orders    Testosterone, free, total    Moderate mixed hyperlipidemia not requiring statin therapy        Relevant Orders    Lipid Panel With Direct LDL    Encounter for laboratory testing for COVID-19 virus        Relevant Orders    SARS-CoV2 Antibody, Total (IgG, IgA, IgM) [facility]           Preventive health issues were discussed with patient, and age appropriate screening tests were ordered as noted in patient's After Visit Summary  Personalized health advice and appropriate referrals for health education or preventive services given if needed, as noted in patient's After Visit Summary       History of Present Illness:     Patient presents for Medicare Annual Wellness visit    Patient Care Team:  Francheska Damon DO as PCP - General (Internal Medicine)     Problem List:     Patient Active Problem List   Diagnosis    Hiatal hernia    Lipoma of torso      Past Medical and Surgical History:     Past Medical History:   Diagnosis Date    Ganglion     Hiatal hernia     Hypogonadism in male     Lump or mass in breast      Past Surgical History:   Procedure Laterality Date    BREAST BIOPSY      NV EXCIS TENDN/CAPSULE LESN,FOOT Left 12/2/2016    Procedure: EXCISION FIBROMA;  Surgeon: Devonte France DPM;  Location: [facility];  Service: Podiatry      Family History:     Family History   Problem Relation Age of Onset    Dementia Mother     COPD Mother     Hypertension Mother     Coronary artery disease Father    [facility] COPD Father     Hypertension Father     Lung cancer Sister       Social History:     Social History     Socioeconomic History    Marital status: Single     Spouse name: None    Number of children: None    Years of education: None    Highest education level: None   Occupational History    None   Tobacco Use    Smoking status: Former Smoker     Packs/day: 0 50     Years: 20 00     Pack years: 10 00     Types: Cigarettes     Quit date:      Years since quittin 6    Smokeless tobacco: Never Used    Tobacco comment: socially   Vaping Use    Vaping Use: Never used   Substance and Sexual Activity    Alcohol use: Not Currently     Comment: Rare     Drug use: Never    Sexual activity: None   Other Topics Concern    None   Social History Narrative    None     Social Determinants of Health     Financial Resource Strain:     Difficulty of Paying Living Expenses:    Food Insecurity:     Worried About Running Out of Food in the Last Year:     Ran Out of Food in the Last Year:    Transportation Needs:     Lack of Transportation (Medical):      Lack of Transportation (Non-Medical):    Physical Activity:     Days of Exercise per Week:     Minutes of Exercise per Session:    Stress:     Feeling of Stress :    Social Connections:     Frequency of Communication with Friends and Family:     Frequency of Social Gatherings with Friends and Family:     Attends Zoroastrianism Services:     Active Member of Clubs or Organizations:     Attends Club or Organization Meetings:     Marital Status:    Intimate Partner Violence:     Fear of Current or Ex-Partner:     Emotionally Abused:     Physically Abused:     Sexually Abused:       Medications and Allergies:     Current Outpatient Medications   Medication Sig Dispense Refill    cholecalciferol (VITAMIN D3) 1,000 units tablet Take 1,000 Units by mouth daily      co-enzyme Q-10 30 MG capsule Take 30 mg by mouth 3 (three) times a day      Magnesium Gluconate 550 MG TABS Take 30 mg by mouth daily      Multiple Vitamin (MULTIVITAMIN) capsule Take 1 capsule by mouth daily      testosterone (AndroGel Pump) 1 62 % TD gel pump Apply 4 actuation (81 mg total) topically every morning 75 g 1     No current facility-administered medications for this visit  No Known Allergies   Immunizations:     Immunization History   Administered Date(s) Administered    SARS-CoV-2 / COVID-19 mRNA IM (Pfizer-BioNTech) 03/31/2021, 04/21/2021    Tdap 08/27/2019      Health Maintenance:         Topic Date Due    Hepatitis C Screening  Never done    Colorectal Cancer Screening  Never done         Topic Date Due    Influenza Vaccine (1) 09/01/2021      Medicare Health Risk Assessment:     /84   Pulse 69   Resp 16   Ht 6' (1 829 m)   Wt 78 3 kg (172 lb 9 6 oz)   SpO2 99%   BMI 23 41 kg/m²      Casey Sanchez is here for his Initial Wellness visit  Last Medicare Wellness visit information reviewed, patient interviewed and updates made to the record today  Health Risk Assessment:   Patient rates overall health as good  Patient feels that their physical health rating is same  Patient is satisfied with their life  Eyesight was rated as same  Hearing was rated as slightly worse  Patient feels that their emotional and mental health rating is slightly better  Patients states they are never, rarely angry  Patient states they are often unusually tired/fatigued  Pain experienced in the last 7 days has been none  Patient states that he has experienced no weight loss or gain in last 6 months  Depression Screening:   PHQ-2 Score: 0      Fall Risk Screening: In the past year, patient has experienced: no history of falling in past year      Home Safety:  Patient does not have trouble with stairs inside or outside of their home  Patient has working smoke alarms and has working carbon monoxide detector  Home safety hazards include: none  Nutrition:   Current diet is Regular  Medications:   Patient is currently taking over-the-counter supplements  OTC medications include: see medication list  Patient is able to manage medications       Activities of Daily Living (ADLs)/Instrumental Activities of Daily Living (IADLs):   Walk and transfer into and out of bed and chair?: Yes  Dress and groom yourself?: Yes    Bathe or shower yourself?: Yes    Feed yourself? Yes  Do your laundry/housekeeping?: Yes  Manage your money, pay your bills and track your expenses?: Yes  Make your own meals?: Yes    Do your own shopping?: Yes    Previous Hospitalizations:   Any hospitalizations or ED visits within the last 12 months?: No      Advance Care Planning:   Living will: Yes    Durable POA for healthcare: Yes    Advanced directive: Yes      PREVENTIVE SCREENINGS      Cardiovascular Screening:    General: Screening Not Indicated and History Lipid Disorder      Diabetes Screening:     General: Screening Current      Prostate Cancer Screening:    General: Screening Current      Abdominal Aortic Aneurysm (AAA) Screening:    Risk factors include: age between 73-69 yo and tobacco use        Lung Cancer Screening:     General: Screening Not Indicated    Screening, Brief Intervention, and Referral to Treatment (SBIRT)    Screening  Typical number of drinks in a day: 0  Typical number of drinks in a week: 0  Interpretation: Low risk drinking behavior      Single Item Drug Screening:  How often have you used an illegal drug (including marijuana) or a prescription medication for non-medical reasons in the past year? never    Single Item Drug Screen Score: 0  Interpretation: Negative screen for possible drug use disorder      Elijah Carter,

## 2021-08-27 NOTE — PROGRESS NOTES
Assessment/Plan:    #Hypogonadism  -testosterone 578 currently taking testosterone replacement samples given to him by a friend  -will prescribe at this time  -patinet reports testosterone should be around 750  -he is aware of prostate ca risk    #HLD  - HDL 41  -reports not eating healthy and will start soon and exercise  -remains on CoQ10  -2021 carotid US with <50% stenosis b/l    #Lipoma  -surgery removed from left flank  -noted to have lipoma over RUQ area with mild tenderness, continue to monitor    #Depression  -mild  -caring for mom with dementia  -will have help in about 3 weeks along with brother helping out    #Health Maintenance  -routine labs and follouwp 3 months  -covid vaccine up to date  -owns a financial company  -defers colonoscopy and EGD stating that he had multiple in the past  -defers PNA vaccine  -TDAP up to date 2019        No problem-specific Assessment & Plan notes found for this encounter  Diagnoses and all orders for this visit:    Hypogonadism in male  -     testosterone (AndroGel Pump) 1 62 % TD gel pump; Apply 4 actuation (81 mg total) topically every morning  -     Testosterone, free, total; Future    Moderate mixed hyperlipidemia not requiring statin therapy  -     Lipid Panel With Direct LDL;  Future    Encounter for laboratory testing for COVID-19 virus  -     SARS-CoV2 Antibody, Total (IgG, IgA, IgM) [facility]; Future            Current Outpatient Medications:     cholecalciferol (VITAMIN D3) 1,000 units tablet, Take 1,000 Units by mouth daily, Disp: , Rfl:     co-enzyme Q-10 30 MG capsule, Take 30 mg by mouth 3 (three) times a day, Disp: , Rfl:     Magnesium Gluconate 550 MG TABS, Take 30 mg by mouth daily, Disp: , Rfl:     Multiple Vitamin (MULTIVITAMIN) capsule, Take 1 capsule by mouth daily, Disp: , Rfl:     testosterone (AndroGel Pump) 1 62 % TD gel pump, Apply 4 actuation (81 mg total) topically every morning, Disp: 75 g, Rfl: 1    Subjective:      Patient ID: Bena Strickland is a 77 y o  male  HPI     Patient presents for routine checkup  His testosterone was 578 however patient reports that he has been taking topical testosterone replacement therapy for the past 3-4 weeks as a sample that was provided to him by a friend  We recommended that he discontinue taking his friend's medication  We will start him on AndroGel at this time  Patient was also noted to have hyperlipidemia with  and HDL 41  His cholesterol remains elevated  He underwent a carotid ultrasound which was positive for less than 50% stenosis bilaterally  He remains on Co Q10  He reports that he is getting help for caring with his mother and as result will start eating healthier and exercising  We will continue to monitor his cholesterol  Patient is up-to-date on his Tdap vaccine  He defers pneumonia vaccine  He will return to care in 3 months  The following portions of the patient's history were reviewed and updated as appropriate: allergies, current medications, past family history, past medical history, past social history, past surgical history and problem list     Review of Systems   Constitutional: Positive for fatigue  Negative for activity change, appetite change and fever  HENT: Negative for congestion, ear pain, hearing loss, sore throat and tinnitus  Eyes: Negative for photophobia, pain and visual disturbance  Respiratory: Negative for cough, shortness of breath and wheezing  Cardiovascular: Negative for chest pain and leg swelling  Gastrointestinal: Positive for abdominal pain (right upper area pain)  Negative for abdominal distention, constipation, diarrhea, nausea and vomiting  Genitourinary: Negative for difficulty urinating, frequency and hematuria  Musculoskeletal: Negative for arthralgias, back pain, joint swelling, neck pain and neck stiffness  Skin: Negative for color change, pallor, rash and wound     Neurological: Negative for dizziness, tremors, numbness and headaches  Hematological: Does not bruise/bleed easily  Psychiatric/Behavioral: Positive for dysphoric mood and sleep disturbance  Objective:      /84   Pulse 69   Resp 16   Ht 6' (1 829 m)   Wt 78 3 kg (172 lb 9 6 oz)   SpO2 99%   BMI 23 41 kg/m²          Physical Exam  Vitals reviewed  Constitutional:       Appearance: He is well-developed  HENT:      Head: Normocephalic and atraumatic  Right Ear: External ear normal       Left Ear: External ear normal       Nose: Nose normal    Eyes:      Conjunctiva/sclera: Conjunctivae normal       Pupils: Pupils are equal, round, and reactive to light  Neck:      Thyroid: No thyromegaly  Vascular: No JVD  Cardiovascular:      Rate and Rhythm: Normal rate and regular rhythm  Heart sounds: Normal heart sounds  No murmur heard  Pulmonary:      Effort: Pulmonary effort is normal  No respiratory distress  Breath sounds: Normal breath sounds  No wheezing  Abdominal:      General: Bowel sounds are normal  There is no distension  Palpations: Abdomen is soft  Tenderness: There is abdominal tenderness (lipoma over RUQ)  There is no guarding or rebound  Musculoskeletal:         General: No tenderness or deformity  Normal range of motion  Cervical back: Normal range of motion and neck supple  Lymphadenopathy:      Cervical: No cervical adenopathy  Skin:     General: Skin is warm and dry  Findings: No erythema or rash  Neurological:      Mental Status: He is alert and oriented to person, place, and time  Deep Tendon Reflexes: Reflexes are normal and symmetric  Psychiatric:      Comments: Mild depression from caring for mom with dementia           This note was completed in part utilizing [employer] fluency direct voice recognition software     Grammatical errors, random word insertion, spelling mistakes, and incomplete sentences may be an occasional consequence of the system secondary to software limitations, ambient noise and hardware issues  At the time of dictation, efforts were made to edit, clarify and /or correct errors  Please read the chart carefully and recognize, using context, where substitutions have occurred  If you have any questions or concerns about the context, text or information contained within the body of this dictation, please contact myself, the provider, for further clarification

## 2021-08-27 NOTE — PATIENT INSTRUCTIONS
Medicare Preventive Visit Patient Instructions  Thank you for completing your Welcome to Medicare Visit or Medicare Annual Wellness Visit today  Your next wellness visit will be due in one year (8/28/2022)  The screening/preventive services that you may require over the next 5-10 years are detailed below  Some tests may not apply to you based off risk factors and/or age  Screening tests ordered at today's visit but not completed yet may show as past due  Also, please note that scanned in results may not display below  Preventive Screenings:  Service Recommendations Previous Testing/Comments   Colorectal Cancer Screening  · Colonoscopy    · Fecal Occult Blood Test (FOBT)/Fecal Immunochemical Test (FIT)  · Fecal DNA/Cologuard Test  · Flexible Sigmoidoscopy Age: 54-65 years old   Colonoscopy: every 10 years (May be performed more frequently if at higher risk)  OR  FOBT/FIT: every 1 year  OR  Cologuard: every 3 years  OR  Sigmoidoscopy: every 5 years  Screening may be recommended earlier than age 48 if at higher risk for colorectal cancer  Also, an individualized decision between you and your healthcare provider will decide whether screening between the ages of 74-80 would be appropriate   Colonoscopy: Not on file  FOBT/FIT: Not on file  Cologuard: Not on file  Sigmoidoscopy: Not on file          Prostate Cancer Screening Individualized decision between patient and health care provider in men between ages of 53-78   Medicare will cover every 12 months beginning on the day after your 50th birthday PSA: 0 5 ng/mL     Screening Current     Hepatitis C Screening Once for adults born between [facility]  More frequently in patients at high risk for Hepatitis C Hep C Antibody: Not on file        Diabetes Screening 1-2 times per year if you're at risk for diabetes or have pre-diabetes Fasting glucose: No results in last 5 years   A1C: No results in last 5 years    Screening Current   Cholesterol Screening Once every 5 years if you don't have a lipid disorder  May order more often based on risk factors  Lipid panel: 08/11/2021    Screening Not Indicated  History Lipid Disorder      Other Preventive Screenings Covered by Medicare:  1  Abdominal Aortic Aneurysm (AAA) Screening: covered once if your at risk  You're considered to be at risk if you have a family history of AAA or a male between the age of 73-68 who smoking at least 100 cigarettes in your lifetime  2  Lung Cancer Screening: covers low dose CT scan once per year if you meet all of the following conditions: (1) Age 50-69; (2) No signs or symptoms of lung cancer; (3) Current smoker or have quit smoking within the last 15 years; (4) You have a tobacco smoking history of at least 30 pack years (packs per day x number of years you smoked); (5) You get a written order from a healthcare provider  3  Glaucoma Screening: covered annually if you're considered high risk: (1) You have diabetes OR (2) Family history of glaucoma OR (3)  aged 48 and older OR (3)  American aged 72 and older  3  Osteoporosis Screening: covered every 2 years if you meet one of the following conditions: (1) Have a vertebral abnormality; (2) On glucocorticoid therapy for more than 3 months; (3) Have primary hyperparathyroidism; (4) On osteoporosis medications and need to assess response to drug therapy  5  HIV Screening: covered annually if you're between the age of 12-76  Also covered annually if you are younger than 13 and older than 72 with risk factors for HIV infection  For pregnant patients, it is covered up to 3 times per pregnancy      Immunizations:  Immunization Recommendations   Influenza Vaccine Annual influenza vaccination during flu season is recommended for all persons aged >= 6 months who do not have contraindications   Pneumococcal Vaccine (Prevnar and Pneumovax)  * Prevnar = PCV13  * Pneumovax = PPSV23 Adults 25-60 years old: 1-3 doses may be recommended based on certain risk factors  Adults 72 years old: Prevnar (PCV13) vaccine recommended followed by Pneumovax (PPSV23) vaccine  If already received PPSV23 since turning 65, then PCV13 recommended at least one year after PPSV23 dose  Hepatitis B Vaccine 3 dose series if at intermediate or high risk (ex: diabetes, end stage renal disease, liver disease)   Tetanus (Td) Vaccine - COST NOT COVERED BY MEDICARE PART B Following completion of primary series, a booster dose should be given every 10 years to maintain immunity against tetanus  Td may also be given as tetanus wound prophylaxis  Tdap Vaccine - COST NOT COVERED BY MEDICARE PART B Recommended at least once for all adults  For pregnant patients, recommended with each pregnancy  Shingles Vaccine (Shingrix) - COST NOT COVERED BY MEDICARE PART B  2 shot series recommended in those aged 48 and above     Health Maintenance Due:      Topic Date Due    Hepatitis C Screening  Never done    Colorectal Cancer Screening  Never done     Immunizations Due:      Topic Date Due    Influenza Vaccine (1) 09/01/2021     Advance Directives   What are advance directives? Advance directives are legal documents that state your wishes and plans for medical care  These plans are made ahead of time in case you lose your ability to make decisions for yourself  Advance directives can apply to any medical decision, such as the treatments you want, and if you want to donate organs  What are the types of advance directives? There are many types of advance directives, and each state has rules about how to use them  You may choose a combination of any of the following:  · Living will: This is a written record of the treatment you want  You can also choose which treatments you do not want, which to limit, and which to stop at a certain time  This includes surgery, medicine, IV fluid, and tube feedings  · Durable power of  for healthcare [facility]):   This is a written record that states who you want to make healthcare choices for you when you are unable to make them for yourself  This person, called a proxy, is usually a family member or a friend  You may choose more than 1 proxy  · Do not resuscitate (DNR) order:  A DNR order is used in case your heart stops beating or you stop breathing  It is a request not to have certain forms of treatment, such as CPR  A DNR order may be included in other types of advance directives  · Medical directive: This covers the care that you want if you are in a coma, near death, or unable to make decisions for yourself  You can list the treatments you want for each condition  Treatment may include pain medicine, surgery, blood transfusions, dialysis, IV or tube feedings, and a ventilator (breathing machine)  · Values history: This document has questions about your views, beliefs, and how you feel and think about life  This information can help others choose the care that you would choose  Why are advance directives important? An advance directive helps you control your care  Although spoken wishes may be used, it is better to have your wishes written down  Spoken wishes can be misunderstood, or not followed  Treatments may be given even if you do not want them  An advance directive may make it easier for your family to make difficult choices about your care  © Copyright [employer] 2018 Information is for End User's use only and may not be sold, redistributed or otherwise used for commercial purposes   All illustrations and images included in CareNotes® are the copyrighted property of [employer]  or [address] [place] [address] [employer]

## 2021-08-31 ENCOUNTER — TELEPHONE (OUTPATIENT)
Dept: INTERNAL MEDICINE CLINIC | Facility: CLINIC | Age: 66
End: 2021-08-31

## 2021-08-31 NOTE — TELEPHONE ENCOUNTER
PT SAID THAT WHEN HE WAS HERE YOU WANTED HIM TO HAVE A STRESS TEST AND AN ULTRASOUND BUT HE HAD DECLINED    PT WOULD NOW LIKE BOTH     PLEASE ADVISE IF OK AND I'LL CALL HIM BACK

## 2021-08-31 NOTE — TELEPHONE ENCOUNTER
SPOKE WITH PT, GAVE MESSAGE    HE KNOWS TO CALL US BACK WITH THE DATE OF THE MYOVIEW SO THAT WE CAN GET THE PIROR AUTH DONE     SHARON, JUST AN FYI THAT THIS IS GOING TO NEED TO BE DONE

## 2021-08-31 NOTE — TELEPHONE ENCOUNTER
CALLED PT, GAVE MESSAGE    HE SAID THAT HE'D RATHER IT BE A NUCLEAR STRESS TEST PER A FRIEND OF HIS    PLEASE ADVISE IF YOU WANT TO CHANGE IT OR NOT

## 2021-09-01 NOTE — TELEPHONE ENCOUNTER
Per Kina Carrasquillo pt is scheduled for 9/10 MYOVIEW at [facility]   Sp/w [facility] at [facility], [facility], xferred to [facility] approved      Brooke Raymond [id number]   VALID UNTIL   - 22

## 2021-09-08 ENCOUNTER — TELEPHONE (OUTPATIENT)
Dept: NON INVASIVE DIAGNOSTICS | Facility: CLINIC | Age: 66
End: 2021-09-08

## 2021-09-10 ENCOUNTER — HOSPITAL ENCOUNTER (OUTPATIENT)
Dept: NON INVASIVE DIAGNOSTICS | Facility: CLINIC | Age: 66
Discharge: HOME | End: 2021-09-10
Payer: COMMERCIAL

## 2021-09-10 ENCOUNTER — TELEPHONE (OUTPATIENT)
Dept: INTERNAL MEDICINE CLINIC | Facility: CLINIC | Age: 66
End: 2021-09-10

## 2021-09-10 DIAGNOSIS — E78.2 MODERATE MIXED HYPERLIPIDEMIA NOT REQUIRING STATIN THERAPY: ICD-10-CM

## 2021-09-10 DIAGNOSIS — I25.10 CORONARY ARTERY DISEASE INVOLVING NATIVE HEART WITHOUT ANGINA PECTORIS, UNSPECIFIED VESSEL OR LESION TYPE: ICD-10-CM

## 2021-09-10 LAB
ARRHY DURING EX: NORMAL
CHEST PAIN STATEMENT: NORMAL
MAX DIASTOLIC BP: 86 MMHG
MAX HEART RATE: 136 BPM
MAX PREDICTED HEART RATE: 154 BPM
MAX. SYSTOLIC BP: 198 MMHG
PROTOCOL NAME: NORMAL
REASON FOR TERMINATION: NORMAL
TARGET HR FORMULA: NORMAL
TEST INDICATION: NORMAL
TIME IN EXERCISE PHASE: NORMAL

## 2021-09-10 PROCEDURE — 93017 CV STRESS TEST TRACING ONLY: CPT

## 2021-09-10 PROCEDURE — 93016 CV STRESS TEST SUPVJ ONLY: CPT | Performed by: INTERNAL MEDICINE

## 2021-09-10 PROCEDURE — 93018 CV STRESS TEST I&R ONLY: CPT | Performed by: INTERNAL MEDICINE

## 2021-09-10 PROCEDURE — A9502 TC99M TETROFOSMIN: HCPCS

## 2021-09-10 PROCEDURE — 78452 HT MUSCLE IMAGE SPECT MULT: CPT | Performed by: INTERNAL MEDICINE

## 2021-09-10 PROCEDURE — 78452 HT MUSCLE IMAGE SPECT MULT: CPT

## 2021-09-10 PROCEDURE — G1004 CDSM NDSC: HCPCS

## 2021-09-10 NOTE — TELEPHONE ENCOUNTER
----- Message from Elijah Carter DO sent at 9/10/2021 11:39 AM EDT -----  Please let patient know his stress test came back fine   No issues with his coronary arteries

## 2022-02-18 ENCOUNTER — RA CDI HCC (OUTPATIENT)
Dept: OTHER | Facility: HOSPITAL | Age: 67
End: 2022-02-18

## 2022-02-18 NOTE — PROGRESS NOTES
[facility]  coding opportunities       Chart reviewed, no opportunity found: CHART REVIEWED, NO OPPORTUNITY FOUND                        Patients insurance company: [facility]

## 2022-02-24 ENCOUNTER — OFFICE VISIT (OUTPATIENT)
Dept: INTERNAL MEDICINE CLINIC | Facility: CLINIC | Age: 67
End: 2022-02-24
Payer: COMMERCIAL

## 2022-02-24 VITALS
HEART RATE: 68 BPM | RESPIRATION RATE: 15 BRPM | DIASTOLIC BLOOD PRESSURE: 99 MMHG | BODY MASS INDEX: 22.35 KG/M2 | SYSTOLIC BLOOD PRESSURE: 150 MMHG | HEIGHT: 72 IN | OXYGEN SATURATION: 98 % | WEIGHT: 165 LBS | TEMPERATURE: 98.5 F

## 2022-02-24 DIAGNOSIS — N40.0 BENIGN PROSTATIC HYPERPLASIA WITHOUT LOWER URINARY TRACT SYMPTOMS: ICD-10-CM

## 2022-02-24 DIAGNOSIS — E29.1 HYPOGONADISM IN MALE: ICD-10-CM

## 2022-02-24 DIAGNOSIS — E78.2 MODERATE MIXED HYPERLIPIDEMIA NOT REQUIRING STATIN THERAPY: Primary | ICD-10-CM

## 2022-02-24 PROCEDURE — 1160F RVW MEDS BY RX/DR IN RCRD: CPT | Performed by: INTERNAL MEDICINE

## 2022-02-24 PROCEDURE — 99214 OFFICE O/P EST MOD 30 MIN: CPT | Performed by: INTERNAL MEDICINE

## 2022-02-24 PROCEDURE — 3008F BODY MASS INDEX DOCD: CPT | Performed by: INTERNAL MEDICINE

## 2022-02-24 PROCEDURE — 1036F TOBACCO NON-USER: CPT | Performed by: INTERNAL MEDICINE

## 2022-02-24 RX ORDER — TESTOSTERONE CYPIONATE 200 MG/ML
200 INJECTION INTRAMUSCULAR WEEKLY
Qty: 10 ML | Refills: 1 | Status: SHIPPED | OUTPATIENT
Start: 2022-02-24 | End: 2022-04-26 | Stop reason: SDUPTHER

## 2022-02-24 NOTE — PROGRESS NOTES
Assessment/Plan:    #HLD  -awaiting lipids  -continue CoQ10  - carotid US with less than <50% stenosis    #Hypogonadism  -testosterone pending  -will start on 200mcg injection every 7 days  -informed him of prostate ca risk    #BPH  -continue to trend PSA    #Stress  -under lots of stress caring for mother with dementia  -defers therapy for now   -is trying to find placement for mother but is unable to   -is working with social work to try to find a place  -sister recently  as well    #Health Maintenance  -routine labs and followup 2 months  -covid vaccine up to date  -owns a [address]  -to reconsider colonoscopy and EGD in the future      No problem-specific Assessment & Plan notes found for this encounter  Diagnoses and all orders for this visit:    Moderate mixed hyperlipidemia not requiring statin therapy  -     CBC and differential; Future  -     Comprehensive metabolic panel; Future  -     Lipid Panel With Direct LDL; Future    Hypogonadism in male  -     testosterone cypionate (DEPO-TESTOSTERONE) 200 mg/mL SOLN; Inject 1 mL (200 mg total) into a muscle once a week  -     CBC and differential; Future  -     Comprehensive metabolic panel; Future  -     Testosterone, free, total; Future    Benign prostatic hyperplasia without lower urinary tract symptoms  -     CBC and differential; Future  -     Comprehensive metabolic panel; Future  -     PSA, total and free;  Future            Current Outpatient Medications:     cholecalciferol (VITAMIN D3) 1,000 units tablet, Take 1,000 Units by mouth daily, Disp: , Rfl:     co-enzyme Q-10 30 MG capsule, Take 30 mg by mouth 3 (three) times a day, Disp: , Rfl:     Magnesium Gluconate 550 MG TABS, Take 30 mg by mouth daily, Disp: , Rfl:     Multiple Vitamin (MULTIVITAMIN) capsule, Take 1 capsule by mouth daily, Disp: , Rfl:     testosterone cypionate (DEPO-TESTOSTERONE) 200 mg/mL SOLN, Inject 1 mL (200 mg total) into a muscle once a week, Disp: 10 mL, Rfl: 1    Subjective:      Patient ID: Lazaro Claros is a 77 y o  male  HPI     Patient presents for an acute visit  Reports that he is under lot of stress  He is caring for his elderly mother who has worsening dementia  He is under significant amounts of stress  He has been trying to get resources for her however has not been successful  He will plan on seeing Alicia Woods to see if this will provide him with any assistance  He may need to take to his mother to the emergency room if he is unable to care for her  Patient reports that he is under lot of depression  His sister was on hospice and  several months ago  Patient reports that he has been feeling down  He defer seeing any therapist at this time  He states that he would like to get back to his old routine of exercising and seeing if this will provide him with any relief  He reports that he is interested in resuming his testosterone  We discussed the risk of testosterone replacement therapy  He is aware of this  He would like to go back on testosterone injections once a week  We will send this in to his pharmacy  He will return to care in 2 months for a checkup  The following portions of the patient's history were reviewed and updated as appropriate: allergies, current medications, past family history, past medical history, past social history, past surgical history and problem list     Review of Systems   Constitutional: Positive for fatigue  Negative for activity change, appetite change and fever  HENT: Negative for congestion, ear pain, hearing loss, sore throat and tinnitus  Eyes: Negative for photophobia, pain and visual disturbance  Respiratory: Negative for cough, shortness of breath and wheezing  Cardiovascular: Negative for chest pain and leg swelling  Gastrointestinal: Negative for abdominal distention, abdominal pain, constipation, diarrhea, nausea and vomiting     Genitourinary: Negative for difficulty urinating, frequency and hematuria  Musculoskeletal: Negative for arthralgias, back pain, joint swelling, neck pain and neck stiffness  Skin: Negative for color change, pallor, rash and wound  Neurological: Positive for weakness (overall fatigue)  Negative for dizziness, tremors, numbness and headaches  Hematological: Does not bruise/bleed easily  Psychiatric/Behavioral: Positive for decreased concentration and dysphoric mood  Objective:      /99   Pulse 68   Temp 98 5 °F (36 9 °C)   Resp 15   Ht 6' (1 829 m)   Wt 74 8 kg (165 lb)   SpO2 98%   BMI 22 38 kg/m²          Physical Exam  Vitals reviewed  Constitutional:       Appearance: Normal appearance  He is well-developed  HENT:      Head: Normocephalic and atraumatic  Right Ear: External ear normal  There is no impacted cerumen  Left Ear: External ear normal  There is no impacted cerumen  Nose: Nose normal    Eyes:      Conjunctiva/sclera: Conjunctivae normal       Pupils: Pupils are equal, round, and reactive to light  Neck:      Thyroid: No thyromegaly  Vascular: No JVD  Cardiovascular:      Rate and Rhythm: Normal rate and regular rhythm  Heart sounds: Normal heart sounds  No murmur heard  Pulmonary:      Effort: Pulmonary effort is normal  No respiratory distress  Breath sounds: Normal breath sounds  No stridor  No wheezing, rhonchi or rales  Abdominal:      General: Bowel sounds are normal  There is no distension  Palpations: Abdomen is soft  There is no mass  Tenderness: There is no abdominal tenderness  There is no guarding or rebound  Musculoskeletal:         General: No tenderness or deformity  Normal range of motion  Cervical back: Normal range of motion and neck supple  Right lower leg: No edema  Left lower leg: No edema  Lymphadenopathy:      Cervical: No cervical adenopathy  Skin:     General: Skin is warm and dry        Findings: No erythema or rash    Neurological:      Mental Status: He is alert and oriented to person, place, and time  Deep Tendon Reflexes: Reflexes are normal and symmetric  Psychiatric:      Comments: Depressed mood, tearful in office today           This note was completed in part utilizing [employer] fluency direct voice recognition software  Grammatical errors, random word insertion, spelling mistakes, and incomplete sentences may be an occasional consequence of the system secondary to software limitations, ambient noise and hardware issues  At the time of dictation, efforts were made to edit, clarify and /or correct errors  Please read the chart carefully and recognize, using context, where substitutions have occurred  If you have any questions or concerns about the context, text or information contained within the body of this dictation, please contact myself, the provider, for further clarification

## 2022-02-25 ENCOUNTER — TELEPHONE (OUTPATIENT)
Dept: INTERNAL MEDICINE CLINIC | Facility: CLINIC | Age: 67
End: 2022-02-25

## 2022-02-25 NOTE — TELEPHONE ENCOUNTER
Unfortunately there is not a way to put in the labs without attaching a diagnosis to it  Insurance requires this and the medical record also requires this  He can do a home blood draw with [facility] and through them I believe the tech does not see the diagnosis

## 2022-02-25 NOTE — TELEPHONE ENCOUNTER
Azalea Wagoner called and said his lab slips have a dx on them and he does not want this for the  to see or have access to and would like it removed  I explained the codes need to be on there for billing purposes  He said he has had labs slips before with no codes and would like them removed  Please advise      [phone]

## 2022-04-26 ENCOUNTER — OFFICE VISIT (OUTPATIENT)
Dept: INTERNAL MEDICINE CLINIC | Facility: CLINIC | Age: 67
End: 2022-04-26
Payer: COMMERCIAL

## 2022-04-26 VITALS
SYSTOLIC BLOOD PRESSURE: 126 MMHG | DIASTOLIC BLOOD PRESSURE: 80 MMHG | HEIGHT: 72 IN | WEIGHT: 170 LBS | HEART RATE: 63 BPM | RESPIRATION RATE: 16 BRPM | OXYGEN SATURATION: 98 % | BODY MASS INDEX: 23.03 KG/M2

## 2022-04-26 DIAGNOSIS — R03.0 PREHYPERTENSION: ICD-10-CM

## 2022-04-26 DIAGNOSIS — E78.2 MODERATE MIXED HYPERLIPIDEMIA NOT REQUIRING STATIN THERAPY: Primary | ICD-10-CM

## 2022-04-26 DIAGNOSIS — E29.1 HYPOGONADISM IN MALE: ICD-10-CM

## 2022-04-26 LAB
ALBUMIN SERPL-MCNC: 4 G/DL (ref 3.6–5.1)
ALBUMIN/GLOB SERPL: 1.5 (CALC) (ref 1–2.5)
ALP SERPL-CCNC: 74 U/L (ref 35–144)
ALT SERPL-CCNC: 20 U/L (ref 9–46)
AST SERPL-CCNC: 17 U/L (ref 10–35)
BASOPHILS # BLD AUTO: 72 CELLS/UL (ref 0–200)
BASOPHILS NFR BLD AUTO: 0.9 %
BILIRUB SERPL-MCNC: 0.6 MG/DL (ref 0.2–1.2)
BUN SERPL-MCNC: 15 MG/DL (ref 7–25)
BUN/CREAT SERPL: NORMAL (CALC) (ref 6–22)
CALCIUM SERPL-MCNC: 9.2 MG/DL (ref 8.6–10.3)
CHLORIDE SERPL-SCNC: 104 MMOL/L (ref 98–110)
CHOLEST SERPL-MCNC: 164 MG/DL
CHOLEST/HDLC SERPL: 4.2 (CALC)
CO2 SERPL-SCNC: 30 MMOL/L (ref 20–32)
CREAT SERPL-MCNC: 0.96 MG/DL (ref 0.7–1.25)
EOSINOPHIL # BLD AUTO: 184 CELLS/UL (ref 15–500)
EOSINOPHIL NFR BLD AUTO: 2.3 %
ERYTHROCYTE [DISTWIDTH] IN BLOOD BY AUTOMATED COUNT: 13.1 % (ref 11–15)
GLOBULIN SER CALC-MCNC: 2.6 G/DL (CALC) (ref 1.9–3.7)
GLUCOSE SERPL-MCNC: 99 MG/DL (ref 65–99)
HCT VFR BLD AUTO: 50.7 % (ref 38.5–50)
HDLC SERPL-MCNC: 39 MG/DL
HGB BLD-MCNC: 16.7 G/DL (ref 13.2–17.1)
LDLC SERPL CALC-MCNC: 100 MG/DL (CALC)
LYMPHOCYTES # BLD AUTO: 1160 CELLS/UL (ref 850–3900)
LYMPHOCYTES NFR BLD AUTO: 14.5 %
MCH RBC QN AUTO: 30.8 PG (ref 27–33)
MCHC RBC AUTO-ENTMCNC: 32.9 G/DL (ref 32–36)
MCV RBC AUTO: 93.4 FL (ref 80–100)
MONOCYTES # BLD AUTO: 904 CELLS/UL (ref 200–950)
MONOCYTES NFR BLD AUTO: 11.3 %
NEUTROPHILS # BLD AUTO: 5680 CELLS/UL (ref 1500–7800)
NEUTROPHILS NFR BLD AUTO: 71 %
NONHDLC SERPL-MCNC: 125 MG/DL (CALC)
PLATELET # BLD AUTO: 240 THOUSAND/UL (ref 140–400)
PMV BLD REES-ECKER: 10.5 FL (ref 7.5–12.5)
POTASSIUM SERPL-SCNC: 3.9 MMOL/L (ref 3.5–5.3)
PROT SERPL-MCNC: 6.6 G/DL (ref 6.1–8.1)
PSA FREE MFR SERPL: 30 % (CALC)
PSA FREE SERPL-MCNC: 0.3 NG/ML
PSA SERPL-MCNC: 1 NG/ML
RBC # BLD AUTO: 5.43 MILLION/UL (ref 4.2–5.8)
SL AMB EGFR AFRICAN AMERICAN: 95 ML/MIN/1.73M2
SL AMB EGFR NON AFRICAN AMERICAN: 82 ML/MIN/1.73M2
SODIUM SERPL-SCNC: 140 MMOL/L (ref 135–146)
TESTOST FREE SERPL-MCNC: 126.8 PG/ML (ref 35–155)
TESTOST SERPL-MCNC: 783 NG/DL (ref 250–1100)
TRIGL SERPL-MCNC: 153 MG/DL
WBC # BLD AUTO: 8 THOUSAND/UL (ref 3.8–10.8)

## 2022-04-26 PROCEDURE — 3725F SCREEN DEPRESSION PERFORMED: CPT | Performed by: INTERNAL MEDICINE

## 2022-04-26 PROCEDURE — 3008F BODY MASS INDEX DOCD: CPT | Performed by: INTERNAL MEDICINE

## 2022-04-26 PROCEDURE — 99213 OFFICE O/P EST LOW 20 MIN: CPT | Performed by: INTERNAL MEDICINE

## 2022-04-26 PROCEDURE — 1036F TOBACCO NON-USER: CPT | Performed by: INTERNAL MEDICINE

## 2022-04-26 RX ORDER — TESTOSTERONE CYPIONATE 200 MG/ML
200 INJECTION INTRAMUSCULAR WEEKLY
Qty: 10 ML | Refills: 1 | Status: SHIPPED | OUTPATIENT
Start: 2022-04-26 | End: 2022-05-03 | Stop reason: SDUPTHER

## 2022-04-26 NOTE — PROGRESS NOTES
Assessment/Plan:    #Hypogonadism  -testosterone 783  -continue testosterone 5 days per week    #HLD  - HDL 39 triglyceride 153  -will cut back on shrimp  -increase cardiac exercise    #Prehypertension  -BP stable today  -has decreased stress due to mother now in nursing home, reports she fell at home and was brought to the ED  -feels regret having to take mom there    No problem-specific Assessment & Plan notes found for this encounter  Diagnoses and all orders for this visit:    Moderate mixed hyperlipidemia not requiring statin therapy    Hypogonadism in male  -     testosterone cypionate (DEPO-TESTOSTERONE) 200 mg/mL SOLN; Inject 1 mL (200 mg total) into a muscle once a week    Prehypertension            Current Outpatient Medications:     cholecalciferol (VITAMIN D3) 1,000 units tablet, Take 1,000 Units by mouth daily, Disp: , Rfl:     co-enzyme Q-10 30 MG capsule, Take 30 mg by mouth 3 (three) times a day, Disp: , Rfl:     Magnesium Gluconate 550 MG TABS, Take 30 mg by mouth daily, Disp: , Rfl:     Multiple Vitamin (MULTIVITAMIN) capsule, Take 1 capsule by mouth daily, Disp: , Rfl:     testosterone cypionate (DEPO-TESTOSTERONE) 200 mg/mL SOLN, Inject 1 mL (200 mg total) into a muscle once a week, Disp: 10 mL, Rfl: 1    Subjective:      Patient ID: Fazal Du is a 77 y o  male  HPI     Patient presents for acute visit follow-up  Reports that he has been doing much better  His mother fell at home and was unable to get up  [facility] and took her to the emergency room  They have placed her at a nursing home and she is currently top doing well there  He reports that he feels regret taking her there however he had no other options  He states that he has been able to better take care himself and has been exercising and sleeping much better  His most recent set of labs today reveal an LDL of 100 and HDL 39 with a triglyceride level 153    He reports that he has been eating a lot of sure been will cut back on this  His blood pressure is stable today  He is currently taking testosterone injections every 5 days  We are waiting for his levels  The following portions of the patient's history were reviewed and updated as appropriate: allergies, current medications, past family history, past medical history, past social history, past surgical history and problem list     Review of Systems   Constitutional: Negative for activity change, appetite change, fatigue and fever  HENT: Negative for congestion and sore throat  Respiratory: Negative for cough, shortness of breath and wheezing  Cardiovascular: Negative for chest pain  Gastrointestinal: Negative for abdominal pain, diarrhea, nausea and vomiting  Musculoskeletal: Negative for arthralgias, back pain, neck pain and neck stiffness  Neurological: Negative for dizziness and headaches  Objective:      /80   Pulse 63   Resp 16   Ht 6' (1 829 m)   Wt 77 1 kg (170 lb)   SpO2 98%   BMI 23 06 kg/m²          Physical Exam  Constitutional:       General: He is not in acute distress  Appearance: He is well-developed  He is not diaphoretic  HENT:      Head: Normocephalic and atraumatic  Eyes:      Conjunctiva/sclera: Conjunctivae normal       Pupils: Pupils are equal, round, and reactive to light  Neck:      Vascular: No JVD  Trachea: No tracheal deviation  Pulmonary:      Effort: Pulmonary effort is normal  No respiratory distress  Musculoskeletal:         General: No tenderness or deformity  Normal range of motion  Cervical back: Normal range of motion  Skin:     General: Skin is warm and dry  Findings: No erythema or rash  Neurological:      Mental Status: He is alert and oriented to person, place, and time  This note was completed in part utilizing [employer] fluency direct voice recognition software     Grammatical errors, random word insertion, spelling mistakes, and incomplete sentences may be an occasional consequence of the system secondary to software limitations, ambient noise and hardware issues  At the time of dictation, efforts were made to edit, clarify and /or correct errors  Please read the chart carefully and recognize, using context, where substitutions have occurred  If you have any questions or concerns about the context, text or information contained within the body of this dictation, please contact myself, the provider, for further clarification

## 2022-04-28 ENCOUNTER — TELEPHONE (OUTPATIENT)
Dept: INTERNAL MEDICINE CLINIC | Facility: CLINIC | Age: 67
End: 2022-04-28

## 2022-04-28 NOTE — TELEPHONE ENCOUNTER
----- Message from Heriberto Garza DO sent at 4/26/2022  1:21 PM EDT -----  Please let patient know his testosterone is normal at 126 8 which is fine to continue with testosterone every 5 days

## 2022-05-03 DIAGNOSIS — E29.1 HYPOGONADISM IN MALE: ICD-10-CM

## 2022-05-03 RX ORDER — TESTOSTERONE CYPIONATE 200 MG/ML
200 INJECTION INTRAMUSCULAR WEEKLY
Qty: 10 ML | Refills: 1 | Status: SHIPPED | OUTPATIENT
Start: 2022-05-03

## 2022-07-01 ENCOUNTER — VBI (OUTPATIENT)
Dept: ADMINISTRATIVE | Facility: OTHER | Age: 67
End: 2022-07-01

## 2023-04-25 DIAGNOSIS — E78.2 MODERATE MIXED HYPERLIPIDEMIA NOT REQUIRING STATIN THERAPY: ICD-10-CM

## 2023-04-25 DIAGNOSIS — E29.1 HYPOGONADISM IN MALE: Primary | ICD-10-CM

## 2023-04-25 DIAGNOSIS — N40.0 BENIGN PROSTATIC HYPERPLASIA WITHOUT LOWER URINARY TRACT SYMPTOMS: ICD-10-CM

## 2023-04-26 ENCOUNTER — TELEPHONE (OUTPATIENT)
Dept: INTERNAL MEDICINE CLINIC | Facility: CLINIC | Age: 68
End: 2023-04-26

## 2023-04-26 NOTE — TELEPHONE ENCOUNTER
----- Message from Elidia Álvarez DO sent at 4/25/2023  7:48 AM EDT -----  Please let pateint know his LDL cholesterol is elevated at 120  He needs to diet and exercise and cut back on fatty foods  We will repeat the labs again in October   His PSA was normal  His testosterone was low at 198

## 2023-08-29 ENCOUNTER — VBI (OUTPATIENT)
Dept: ADMINISTRATIVE | Facility: OTHER | Age: 68
End: 2023-08-29

## 2023-08-30 ENCOUNTER — TELEPHONE (OUTPATIENT)
Dept: FAMILY MEDICINE CLINIC | Facility: CLINIC | Age: 68
End: 2023-08-30

## 2023-08-30 DIAGNOSIS — E53.8 VITAMIN B12 DEFICIENCY: Primary | ICD-10-CM

## 2023-08-30 DIAGNOSIS — E55.9 VITAMIN D DEFICIENCY: ICD-10-CM

## 2023-08-30 NOTE — TELEPHONE ENCOUNTER
HE WAS TOLD HE HAD AN APPT COMING UP IN OCTOBER BUT HE SAID HE RECEIVED A LETTER FROM INSURANCE COMPANY ASKING HIM TO GET PHYSICAL DONE SOON.   SO HE SAID CANCEL THE OCTOBER AND SCHEDULE IT FOR [place]

## 2023-08-30 NOTE — TELEPHONE ENCOUNTER
He has two appointments scheduled, originally he was to come in October and obtain labs in September but there is also another appointment for September did he need anything to come in sooner than October. If he is fine then he can wait until late September to obtain labs and followup in October.

## 2023-08-30 NOTE — TELEPHONE ENCOUNTER
Delfin Carcamo, labs are ordered for him to obtain between now and a week before he comes into the office in September

## 2023-09-02 LAB
25(OH)D3 SERPL-MCNC: 108 NG/ML (ref 30–100)
ALBUMIN SERPL-MCNC: 4.5 G/DL (ref 3.6–5.1)
ALBUMIN/GLOB SERPL: 1.6 (CALC) (ref 1–2.5)
ALP SERPL-CCNC: 55 U/L (ref 35–144)
ALT SERPL-CCNC: 26 U/L (ref 9–46)
AST SERPL-CCNC: 22 U/L (ref 10–35)
BASOPHILS # BLD AUTO: 48 CELLS/UL (ref 0–200)
BASOPHILS NFR BLD AUTO: 0.7 %
BILIRUB SERPL-MCNC: 0.9 MG/DL (ref 0.2–1.2)
BUN SERPL-MCNC: 17 MG/DL (ref 7–25)
BUN/CREAT SERPL: ABNORMAL (CALC) (ref 6–22)
CALCIUM SERPL-MCNC: 9.5 MG/DL (ref 8.6–10.3)
CHLORIDE SERPL-SCNC: 102 MMOL/L (ref 98–110)
CHOLEST SERPL-MCNC: 202 MG/DL
CHOLEST/HDLC SERPL: 4.8 (CALC)
CO2 SERPL-SCNC: 29 MMOL/L (ref 20–32)
CREAT SERPL-MCNC: 1.06 MG/DL (ref 0.7–1.35)
EOSINOPHIL # BLD AUTO: 117 CELLS/UL (ref 15–500)
EOSINOPHIL NFR BLD AUTO: 1.7 %
ERYTHROCYTE [DISTWIDTH] IN BLOOD BY AUTOMATED COUNT: 12.9 % (ref 11–15)
GFR/BSA.PRED SERPLBLD CYS-BASED-ARV: 76 ML/MIN/1.73M2
GLOBULIN SER CALC-MCNC: 2.9 G/DL (CALC) (ref 1.9–3.7)
GLUCOSE SERPL-MCNC: 113 MG/DL (ref 65–99)
HCT VFR BLD AUTO: 53.1 % (ref 38.5–50)
HDLC SERPL-MCNC: 42 MG/DL
HGB BLD-MCNC: 17.5 G/DL (ref 13.2–17.1)
LDLC SERPL CALC-MCNC: 125 MG/DL (CALC)
LYMPHOCYTES # BLD AUTO: 1566 CELLS/UL (ref 850–3900)
LYMPHOCYTES NFR BLD AUTO: 22.7 %
MCH RBC QN AUTO: 30.8 PG (ref 27–33)
MCHC RBC AUTO-ENTMCNC: 33 G/DL (ref 32–36)
MCV RBC AUTO: 93.5 FL (ref 80–100)
MONOCYTES # BLD AUTO: 787 CELLS/UL (ref 200–950)
MONOCYTES NFR BLD AUTO: 11.4 %
NEUTROPHILS # BLD AUTO: 4382 CELLS/UL (ref 1500–7800)
NEUTROPHILS NFR BLD AUTO: 63.5 %
NONHDLC SERPL-MCNC: 160 MG/DL (CALC)
PLATELET # BLD AUTO: 223 THOUSAND/UL (ref 140–400)
PMV BLD REES-ECKER: 10.6 FL (ref 7.5–12.5)
POTASSIUM SERPL-SCNC: 4.5 MMOL/L (ref 3.5–5.3)
PROT SERPL-MCNC: 7.4 G/DL (ref 6.1–8.1)
PSA FREE MFR SERPL: 22 % (CALC)
PSA FREE SERPL-MCNC: 0.2 NG/ML
PSA SERPL-MCNC: 0.9 NG/ML
RBC # BLD AUTO: 5.68 MILLION/UL (ref 4.2–5.8)
SODIUM SERPL-SCNC: 138 MMOL/L (ref 135–146)
TRIGL SERPL-MCNC: 212 MG/DL
VIT B12 SERPL-MCNC: 463 PG/ML (ref 200–1100)
WBC # BLD AUTO: 6.9 THOUSAND/UL (ref 3.8–10.8)

## 2023-09-04 LAB
25(OH)D3 SERPL-MCNC: 108 NG/ML (ref 30–100)
ALBUMIN SERPL-MCNC: 4.5 G/DL (ref 3.6–5.1)
ALBUMIN/GLOB SERPL: 1.6 (CALC) (ref 1–2.5)
ALP SERPL-CCNC: 55 U/L (ref 35–144)
ALT SERPL-CCNC: 26 U/L (ref 9–46)
AST SERPL-CCNC: 22 U/L (ref 10–35)
BASOPHILS # BLD AUTO: 48 CELLS/UL (ref 0–200)
BASOPHILS NFR BLD AUTO: 0.7 %
BILIRUB SERPL-MCNC: 0.9 MG/DL (ref 0.2–1.2)
BUN SERPL-MCNC: 17 MG/DL (ref 7–25)
BUN/CREAT SERPL: ABNORMAL (CALC) (ref 6–22)
CALCIUM SERPL-MCNC: 9.5 MG/DL (ref 8.6–10.3)
CHLORIDE SERPL-SCNC: 102 MMOL/L (ref 98–110)
CHOLEST SERPL-MCNC: 202 MG/DL
CHOLEST/HDLC SERPL: 4.8 (CALC)
CO2 SERPL-SCNC: 29 MMOL/L (ref 20–32)
CREAT SERPL-MCNC: 1.06 MG/DL (ref 0.7–1.35)
EOSINOPHIL # BLD AUTO: 117 CELLS/UL (ref 15–500)
EOSINOPHIL NFR BLD AUTO: 1.7 %
ERYTHROCYTE [DISTWIDTH] IN BLOOD BY AUTOMATED COUNT: 12.9 % (ref 11–15)
GFR/BSA.PRED SERPLBLD CYS-BASED-ARV: 76 ML/MIN/1.73M2
GLOBULIN SER CALC-MCNC: 2.9 G/DL (CALC) (ref 1.9–3.7)
GLUCOSE SERPL-MCNC: 113 MG/DL (ref 65–99)
HCT VFR BLD AUTO: 53.1 % (ref 38.5–50)
HDLC SERPL-MCNC: 42 MG/DL
HGB BLD-MCNC: 17.5 G/DL (ref 13.2–17.1)
LDLC SERPL CALC-MCNC: 125 MG/DL (CALC)
LYMPHOCYTES # BLD AUTO: 1566 CELLS/UL (ref 850–3900)
LYMPHOCYTES NFR BLD AUTO: 22.7 %
MCH RBC QN AUTO: 30.8 PG (ref 27–33)
MCHC RBC AUTO-ENTMCNC: 33 G/DL (ref 32–36)
MCV RBC AUTO: 93.5 FL (ref 80–100)
MONOCYTES # BLD AUTO: 787 CELLS/UL (ref 200–950)
MONOCYTES NFR BLD AUTO: 11.4 %
NEUTROPHILS # BLD AUTO: 4382 CELLS/UL (ref 1500–7800)
NEUTROPHILS NFR BLD AUTO: 63.5 %
NONHDLC SERPL-MCNC: 160 MG/DL (CALC)
PLATELET # BLD AUTO: 223 THOUSAND/UL (ref 140–400)
PMV BLD REES-ECKER: 10.6 FL (ref 7.5–12.5)
POTASSIUM SERPL-SCNC: 4.5 MMOL/L (ref 3.5–5.3)
PROT SERPL-MCNC: 7.4 G/DL (ref 6.1–8.1)
PSA FREE MFR SERPL: 22 % (CALC)
PSA FREE SERPL-MCNC: 0.2 NG/ML
PSA SERPL-MCNC: 0.9 NG/ML
RBC # BLD AUTO: 5.68 MILLION/UL (ref 4.2–5.8)
SODIUM SERPL-SCNC: 138 MMOL/L (ref 135–146)
TESTOST FREE SERPL-MCNC: 51.9 PG/ML (ref 35–155)
TESTOST SERPL-MCNC: 485 NG/DL (ref 250–1100)
TRIGL SERPL-MCNC: 212 MG/DL
VIT B12 SERPL-MCNC: 463 PG/ML (ref 200–1100)
WBC # BLD AUTO: 6.9 THOUSAND/UL (ref 3.8–10.8)

## 2023-09-14 ENCOUNTER — RA CDI HCC (OUTPATIENT)
Dept: OTHER | Facility: HOSPITAL | Age: 68
End: 2023-09-14

## 2023-09-14 NOTE — PROGRESS NOTES
[address] coding opportunities       Chart reviewed, no opportunity found: [address] Review     Patients Insurance     Medicare Insurance: [employer]

## 2023-09-15 ENCOUNTER — RA CDI HCC (OUTPATIENT)
Dept: OTHER | Facility: HOSPITAL | Age: 68
End: 2023-09-15

## 2023-09-16 NOTE — PROGRESS NOTES
[address] [facility] coding opportunities     D58.2     Chart Reviewed number of suggestions sent to Provider: 1     GR    Patients Insurance     Medicare Insurance: [facility]

## 2023-09-25 ENCOUNTER — RA CDI HCC (OUTPATIENT)
Dept: OTHER | Facility: HOSPITAL | Age: 68
End: 2023-09-25

## 2023-09-25 NOTE — PROGRESS NOTES
[address] coding opportunities       Chart reviewed, no opportunity found: [address] Review     Patients Insurance     Medicare Insurance: [employer]

## 2023-10-03 ENCOUNTER — OFFICE VISIT (OUTPATIENT)
Dept: INTERNAL MEDICINE CLINIC | Facility: CLINIC | Age: 68
End: 2023-10-03
Payer: COMMERCIAL

## 2023-10-03 VITALS
TEMPERATURE: 97.5 F | SYSTOLIC BLOOD PRESSURE: 133 MMHG | OXYGEN SATURATION: 98 % | BODY MASS INDEX: 22.48 KG/M2 | HEART RATE: 77 BPM | RESPIRATION RATE: 16 BRPM | DIASTOLIC BLOOD PRESSURE: 81 MMHG | WEIGHT: 166 LBS | HEIGHT: 72 IN

## 2023-10-03 DIAGNOSIS — Z00.00 MEDICARE ANNUAL WELLNESS VISIT, SUBSEQUENT: ICD-10-CM

## 2023-10-03 DIAGNOSIS — J06.9 UPPER RESPIRATORY TRACT INFECTION, UNSPECIFIED TYPE: ICD-10-CM

## 2023-10-03 DIAGNOSIS — E55.9 VITAMIN D DEFICIENCY: Primary | ICD-10-CM

## 2023-10-03 DIAGNOSIS — E29.1 HYPOGONADISM IN MALE: ICD-10-CM

## 2023-10-03 DIAGNOSIS — E78.2 MODERATE MIXED HYPERLIPIDEMIA NOT REQUIRING STATIN THERAPY: ICD-10-CM

## 2023-10-03 DIAGNOSIS — R22.2 SUBCUTANEOUS NODULE OF ABDOMINAL WALL: ICD-10-CM

## 2023-10-03 DIAGNOSIS — R53.83 OTHER FATIGUE: ICD-10-CM

## 2023-10-03 DIAGNOSIS — R10.11 RUQ ABDOMINAL PAIN: ICD-10-CM

## 2023-10-03 PROCEDURE — G0439 PPPS, SUBSEQ VISIT: HCPCS | Performed by: INTERNAL MEDICINE

## 2023-10-03 PROCEDURE — 99214 OFFICE O/P EST MOD 30 MIN: CPT | Performed by: INTERNAL MEDICINE

## 2023-10-03 RX ORDER — AZITHROMYCIN 250 MG/1
TABLET, FILM COATED ORAL
Qty: 6 TABLET | Refills: 0 | Status: SHIPPED | OUTPATIENT
Start: 2023-10-03 | End: 2023-10-04 | Stop reason: SDUPTHER

## 2023-10-03 RX ORDER — TESTOSTERONE CYPIONATE 200 MG/ML
200 INJECTION, SOLUTION INTRAMUSCULAR
Qty: 10 ML | Refills: 3 | Status: SHIPPED | OUTPATIENT
Start: 2023-10-03 | End: 2023-10-04 | Stop reason: SDUPTHER

## 2023-10-03 RX ORDER — OMEPRAZOLE 40 MG/1
CAPSULE, DELAYED RELEASE ORAL
COMMUNITY
Start: 2023-08-14

## 2023-10-03 RX ORDER — TESTOSTERONE GEL, 1% 10 MG/G
50 GEL TRANSDERMAL DAILY
Qty: 90 PACKET | Refills: 3 | Status: SHIPPED | OUTPATIENT
Start: 2023-10-03 | End: 2023-10-04 | Stop reason: SDUPTHER

## 2023-10-03 RX ORDER — SODIUM, POTASSIUM,MAG SULFATES 17.5-3.13G
SOLUTION, RECONSTITUTED, ORAL ORAL
COMMUNITY
Start: 2023-08-03

## 2023-10-03 NOTE — PROGRESS NOTES
Assessment and Plan:     Problem List Items Addressed This Visit        Endocrine    Hypogonadism in male    Relevant Medications    testosterone cypionate (DEPO-TESTOSTERONE) 200 mg/mL SOLN    testosterone (ANDROGEL) 1%    Other Relevant Orders    Testosterone, free, total       Other    Moderate mixed hyperlipidemia not requiring statin therapy    Relevant Orders    Lipid Panel with Direct LDL reflex   Other Visit Diagnoses     Vitamin D deficiency    -  Primary    Other fatigue        Relevant Orders    Sedimentation rate, automated    C-reactive protein    TSH, 3rd generation with Free T4 reflex    Lyme Disease Serology w/Reflex    XIMENA Screen w/ Reflex to Titer/Pattern    Subcutaneous nodule of abdominal wall        Relevant Orders    CT abdomen w contrast    RUQ abdominal pain        Relevant Orders    CT abdomen w contrast    Upper respiratory tract infection, unspecified type        Relevant Medications    azithromycin (Zithromax) 250 mg tablet        #HLD  - HDL 42, triglycerides 212 and elevated  -was eating lots of shrimp and has cut out now  -cutting back on red meat  -is doing cardiac exercise  -father with MI but had emphysema and they did not do surgery to reverse it  -reports he had a CT coronary calcium score about 2 years ago that was normal  -is on CoQ10  -2021 carotid US with less than <50% stenosis  -repeat lipids    #Abdominal Nodule  -over RUQ with tenderness  -will obtain CT Abdomen with thin slices    #Vitamin D  -elevated at 108  -has been taking lots of aloe vitamin D supplements and will cut down to just 5000 units daily    #Fatigue  -unclear etiology  -has been worsening  -feeling tired  -NM stress test normal 2021  -will obtain ESR, CRP, lyme, TSH, XIMENA  -to consider ECHO but patient defers for now    #URI  -present 3 weeks with yellow mucus  -will start on azithromycin     #Mucocele  -saw ENT  -treated with kenalog paste  -now seeing dentist for dental implants     #GERD  -eating lots of garlic and tomatoes and is aware that can flare up symptoms  -now stable after 30 days of prilosec  -EGD 2023 with gastritis and small hiatal hernia     #Medial Left Thigh Pain  -initial pain today but now resolved  -ambulating without problems  -pain likely due to running on treadmill and will switch to low impact exercises     #Hypogonadism  -testosterone 485  -per patient goal testosterone is 750  -remains on 200mcg injection every 5 days  -will also alternate with testosterone gel  -informed him of prostate ca risk and cardiac risk    #BPH  -PSA 0.9 and stable  -DAYRON with colonoscopy 2023 unremarkable     #Stress  -under lots of stress caring for mother with dementia  -mother is in a facility now and he is feeling a little more at ease     #Health Maintenance  -routine labs and followup 3 months  -covid vaccine up to date, defers booster  -defers flu vaccine  -owns a financial company  -colonoscopy and EGD due 2026 [facility] health issues were discussed with patient, and age appropriate screening tests were ordered as noted in patient's After Visit Summary. Personalized health advice and appropriate referrals for health education or preventive services given if needed, as noted in patient's After Visit Summary. History of Present Illness:     Patient presents for a Medicare Wellness Visit    HPI   Patient presents for a routine checkup. Denies any recent hospitalizations or surgeries. He continues to have a nodule with tenderness over his abdomen. We will obtain a CT scan of this area with thin slices to further evaluate this. He is currently taking vitamin D and his vitamin D is elevated at 108. He will cut down to 5000 units daily. His PSA is 0.9 and stable. His testosterone level is 45 and stable as well. His normal baseline is usually around 750. He is currently taking testosterone supplementation he will continue with this.   He is aware of the risk of prostate cancer with this. He had a DAYRON examination with colorectal surgery and is currently stable. His hemoglobin is 17.5 likely elevated from the testosterone. His LDL is 125 and HDL 42 with triglycerides 212. He reports that he was eating a lot of shrimp and has cut this out. He has cut back on red meat as well. We will repeat his lipids. He has been experiencing significant fatigue and we will check a ESR and CRP and Lyme test and TSH as well. He is recovering from an illness and is coughing up yellow sputum at times. We will start him on azithromycin. He will return to care in 3 months. Patient Care Team:  Sebastian Mendoza DO as PCP - General (Internal Medicine)     Review of Systems:     Review of Systems   Constitutional: Positive for fatigue. Negative for activity change, appetite change and fever. HENT: Negative for congestion, ear pain, hearing loss, sore throat and tinnitus. Eyes: Negative for photophobia, pain and visual disturbance. Respiratory: Negative for cough, shortness of breath and wheezing. Cardiovascular: Negative for chest pain and leg swelling. Gastrointestinal: Positive for abdominal pain (RUQ). Negative for abdominal distention, constipation, diarrhea, nausea and vomiting. Genitourinary: Negative for difficulty urinating, frequency and hematuria. Musculoskeletal: Negative for arthralgias, back pain, gait problem, joint swelling, myalgias, neck pain and neck stiffness. Skin: Negative for color change, pallor, rash and wound. Neurological: Negative for dizziness, tremors, numbness and headaches. Hematological: Does not bruise/bleed easily.         Problem List:     Patient Active Problem List   Diagnosis   • Hiatal hernia   • Lipoma of torso   • Moderate mixed hyperlipidemia not requiring statin therapy   • Hypogonadism in male      Past Medical and Surgical History:     Past Medical History:   Diagnosis Date   • Ganglion    • Hiatal hernia    • Hypogonadism in male    • Lump or mass in breast      Past Surgical History:   Procedure Laterality Date   • BREAST BIOPSY     • OH EXC LESION TENDON SHEATH/CAPSULE W/SYNVCT FOOT Left 2016    Procedure: EXCISION FIBROMA;  Surgeon: Randy Lew DPM;  Location: [facility];  Service: Podiatry      Family History:     Family History   Problem Relation Age of Onset   • Dementia Mother    • COPD Mother    • Hypertension Mother    • Coronary artery disease Father    • COPD Father    • Hypertension Father    • Lung cancer Sister       Social History:     Social History     Socioeconomic History   • Marital status: Single     Spouse name: None   • Number of children: None   • Years of education: None   • Highest education level: None   Occupational History   • None   Tobacco Use   • Smoking status: Former     Packs/day: 0.50     Years: 20.00     Total pack years: 10.00     Types: Cigarettes     Quit date:      Years since quittin.7   • Smokeless tobacco: Never   • Tobacco comments:     socially   Vaping Use   • Vaping Use: Never used   Substance and Sexual Activity   • Alcohol use: Not Currently     Comment: Rare    • Drug use: Never   • Sexual activity: None   Other Topics Concern   • None   Social History Narrative   • None     Social Determinants of Health     Financial Resource Strain: Low Risk  (10/3/2023)    Overall Financial Resource Strain (CARDIA)    • Difficulty of Paying Living Expenses: Not hard at all   Food Insecurity: Not on file   Transportation Needs: No Transportation Needs (10/3/2023)    PRAPARE - Transportation    • Lack of Transportation (Medical): No    • Lack of Transportation (Non-Medical):  No   Physical Activity: Not on file   Stress: Not on file   Social Connections: Not on file   Intimate Partner Violence: Not on file   Housing Stability: Not on file      Medications and Allergies:     Current Outpatient Medications   Medication Sig Dispense Refill   • azithromycin (Zithromax) 250 mg tablet Take 2 tablets (500 mg total) by mouth daily for 1 day, THEN 1 tablet (250 mg total) daily for 4 days. 6 tablet 0   • cholecalciferol (VITAMIN D3) 1,000 units tablet Take 1,000 Units by mouth daily     • co-enzyme Q-10 30 MG capsule Take 30 mg by mouth 3 (three) times a day     • testosterone (ANDROGEL) 1% Apply 1 packet (50 mg total) topically daily Take only on days when not taking the testosterone injection 90 packet 3   • testosterone cypionate (DEPO-TESTOSTERONE) 200 mg/mL SOLN Inject 1 mL (200 mg total) into a muscle every 5 (five) days 10 mL 3   • Magnesium Gluconate 550 MG TABS Take 30 mg by mouth daily (Patient not taking: Reported on 10/3/2023)     • Multiple Vitamin (MULTIVITAMIN) capsule Take 1 capsule by mouth daily (Patient not taking: Reported on 10/3/2023)     • Na Sulfate-K Sulfate-Mg Sulf 17.5-3.13-1.6 GM/177ML SOLN TAKE 177ML BY MOUTH AS DIRECTED (Patient not taking: Reported on 10/3/2023)     • omeprazole (PriLOSEC) 40 MG capsule TAKE 1 CAPSULE BY MOUTH EVERY DAY 30 MINUTES BEFORE BREAKFAST (Patient not taking: Reported on 10/3/2023)     • triamcinolone (KENALOG) 0.1 % oral topical paste Apply 1 application topically daily at bedtime 5 g 0     No current facility-administered medications for this visit. No Known Allergies   Immunizations:     Immunization History   Administered Date(s) Administered   • COVID-19 PFIZER VACCINE 0.3 ML IM 03/31/2021, 04/21/2021, 10/15/2021   • Tdap 08/27/2019      Health Maintenance:         Topic Date Due   • Hepatitis C Screening  Never done   • Colorectal Cancer Screening  Never done         Topic Date Due   • COVID-19 Vaccine (4 - Pfizer series) 12/10/2021   • Influenza Vaccine (1) Never done      Medicare Screening Tests and Risk Assessments:     Olivia Donald is here for his Subsequent Wellness visit. Health Risk Assessment:   Patient rates overall health as good. Patient feels that their physical health rating is same. Patient is satisfied with their life.  Eyesight was rated as same. Hearing was rated as slightly worse. Patient feels that their emotional and mental health rating is same. Patients states they are never, rarely angry. Patient states they are sometimes unusually tired/fatigued. Pain experienced in the last 7 days has been some. Patient states that he has experienced no weight loss or gain in last 6 months. Fall Risk Screening: In the past year, patient has experienced: no history of falling in past year      Home Safety:  Patient does not have trouble with stairs inside or outside of their home. Patient has working smoke alarms and has no working carbon monoxide detector. Home safety hazards include: none. Nutrition:   Current diet is Regular. Medications:   Patient is not currently taking any over-the-counter supplements. Patient is able to manage medications. Activities of Daily Living (ADLs)/Instrumental Activities of Daily Living (IADLs):   Walk and transfer into and out of bed and chair?: Yes  Dress and groom yourself?: Yes    Bathe or shower yourself?: Yes    Feed yourself?  Yes  Do your laundry/housekeeping?: Yes  Manage your money, pay your bills and track your expenses?: Yes  Make your own meals?: Yes    Do your own shopping?: Yes    Previous Hospitalizations:   Any hospitalizations or ED visits within the last 12 months?: No      Advance Care Planning:   Living will: Yes    Advanced directive: Yes    Five wishes given: No      PREVENTIVE SCREENINGS      Cardiovascular Screening:    General: Screening Not Indicated and History Lipid Disorder      Diabetes Screening:     General: Screening Current      Colorectal Cancer Screening:     General: Screening Current      Prostate Cancer Screening:    General: Screening Current      Abdominal Aortic Aneurysm (AAA) Screening:    Risk factors include: age between 70-75 yo and tobacco use        Lung Cancer Screening:     General: Screening Not Indicated    Screening, Brief Intervention, and Referral to Treatment (SBIRT)    Screening  Typical number of drinks in a day: 0  Typical number of drinks in a week: 0  Interpretation: Low risk drinking behavior. Single Item Drug Screening:  How often have you used an illegal drug (including marijuana) or a prescription medication for non-medical reasons in the past year? never    Single Item Drug Screen Score: 0  Interpretation: Negative screen for possible drug use disorder    No results found. Physical Exam:     /81 (BP Location: Left arm, Patient Position: Sitting, Cuff Size: Adult)   Pulse 77   Temp 97.5 °F (36.4 °C) (Temporal)   Resp 16   Ht 6' (1.829 m)   Wt 75.3 kg (166 lb)   SpO2 98%   BMI 22.51 kg/m²     Physical Exam  Vitals and nursing note reviewed. Constitutional:       General: He is not in acute distress. Appearance: Normal appearance. He is well-developed. He is not ill-appearing. HENT:      Head: Normocephalic and atraumatic. Right Ear: Tympanic membrane, ear canal and external ear normal. There is no impacted cerumen. Left Ear: Tympanic membrane, ear canal and external ear normal. There is no impacted cerumen. Nose: No congestion. Mouth/Throat:      Pharynx: Oropharynx is clear. No oropharyngeal exudate or posterior oropharyngeal erythema. Eyes:      Conjunctiva/sclera: Conjunctivae normal.   Neck:      Vascular: No carotid bruit. Cardiovascular:      Rate and Rhythm: Normal rate and regular rhythm. Pulses: Normal pulses. Heart sounds: Normal heart sounds. No murmur heard. Pulmonary:      Effort: Pulmonary effort is normal. No respiratory distress. Breath sounds: Normal breath sounds. No stridor. No wheezing, rhonchi or rales. Abdominal:      General: There is no distension. Palpations: Abdomen is soft. There is mass (RUQ nodule). Tenderness: There is abdominal tenderness (RUQ). There is no right CVA tenderness, left CVA tenderness, guarding or rebound.    Musculoskeletal: General: No swelling or tenderness. Cervical back: Neck supple. No rigidity or tenderness. Right lower leg: No edema. Left lower leg: No edema. Lymphadenopathy:      Cervical: No cervical adenopathy. Skin:     General: Skin is warm and dry. Capillary Refill: Capillary refill takes less than 2 seconds. Findings: No lesion or rash. Neurological:      Mental Status: He is alert and oriented to person, place, and time. Psychiatric:         Mood and Affect: Mood normal.         Behavior: Behavior normal.         Thought Content:  Thought content normal.         Judgment: Judgment normal.          Adolm Corporal, DO

## 2023-10-03 NOTE — PATIENT INSTRUCTIONS
Medicare Preventive Visit Patient Instructions  Thank you for completing your Welcome to Medicare Visit or Medicare Annual Wellness Visit today. Your next wellness visit will be due in one year (10/3/2024). The screening/preventive services that you may require over the next 5-10 years are detailed below. Some tests may not apply to you based off risk factors and/or age. Screening tests ordered at today's visit but not completed yet may show as past due. Also, please note that scanned in results may not display below. Preventive Screenings:  Service Recommendations Previous Testing/Comments   Colorectal Cancer Screening  · Colonoscopy    · Fecal Occult Blood Test (FOBT)/Fecal Immunochemical Test (FIT)  · Fecal DNA/Cologuard Test  · Flexible Sigmoidoscopy Age: 43-73 years old   Colonoscopy: every 10 years (May be performed more frequently if at higher risk)  OR  FOBT/FIT: every 1 year  OR  Cologuard: every 3 years  OR  Sigmoidoscopy: every 5 years  Screening may be recommended earlier than age 39 if at higher risk for colorectal cancer. Also, an individualized decision between you and your healthcare provider will decide whether screening between the ages of 77-80 would be appropriate.  Colonoscopy: 08/09/2023  FOBT/FIT: Not on file  Cologuard: Not on file  Sigmoidoscopy: Not on file    Screening Current     Prostate Cancer Screening Individualized decision between patient and health care provider in men between ages of 53-66   Medicare will cover every 12 months beginning on the day after your 50th birthday PSA: 0.9 ng/mL     Screening Current     Hepatitis C Screening Once for adults born between [address] [place] [address]  More frequently in patients at high risk for Hepatitis C Hep C Antibody: Not on file        Diabetes Screening 1-2 times per year if you're at risk for diabetes or have pre-diabetes Fasting glucose: No results in last 5 years (No results in last 5 years)  A1C: No results in last 5 years (No results in last 5 years)  Screening Current   Cholesterol Screening Once every 5 years if you don't have a lipid disorder. May order more often based on risk factors. Lipid panel: 09/01/2023  Screening Not Indicated  History Lipid Disorder      Other Preventive Screenings Covered by Medicare:  1. Abdominal Aortic Aneurysm (AAA) Screening: covered once if your at risk. You're considered to be at risk if you have a family history of AAA or a male between the age of 70-76 who smoking at least 100 cigarettes in your lifetime. 2. Lung Cancer Screening: covers low dose CT scan once per year if you meet all of the following conditions: (1) Age 48-67; (2) No signs or symptoms of lung cancer; (3) Current smoker or have quit smoking within the last 15 years; (4) You have a tobacco smoking history of at least 20 pack years (packs per day x number of years you smoked); (5) You get a written order from a healthcare provider. 3. Glaucoma Screening: covered annually if you're considered high risk: (1) You have diabetes OR (2) Family history of glaucoma OR (3)  aged 48 and older OR (3)  American aged 72 and older  3. Osteoporosis Screening: covered every 2 years if you meet one of the following conditions: (1) Have a vertebral abnormality; (2) On glucocorticoid therapy for more than 3 months; (3) Have primary hyperparathyroidism; (4) On osteoporosis medications and need to assess response to drug therapy. 5. HIV Screening: covered annually if you're between the age of 14-79. Also covered annually if you are younger than 13 and older than 72 with risk factors for HIV infection. For pregnant patients, it is covered up to 3 times per pregnancy.     Immunizations:  Immunization Recommendations   Influenza Vaccine Annual influenza vaccination during flu season is recommended for all persons aged >= 6 months who do not have contraindications   Pneumococcal Vaccine   * Pneumococcal conjugate vaccine = PCV13 (Prevnar 13), PCV15 (Vaxneuvance), PCV20 (Prevnar 20)  * Pneumococcal polysaccharide vaccine = PPSV23 (Pneumovax) Adults 2364 years old: 1-3 doses may be recommended based on certain risk factors  Adults 72 years old: 1-2 doses may be recommended based off what pneumonia vaccine you previously received   Hepatitis B Vaccine 3 dose series if at intermediate or high risk (ex: diabetes, end stage renal disease, liver disease)   Tetanus (Td) Vaccine - COST NOT COVERED BY MEDICARE PART B Following completion of primary series, a booster dose should be given every 10 years to maintain immunity against tetanus. Td may also be given as tetanus wound prophylaxis. Tdap Vaccine - COST NOT COVERED BY MEDICARE PART B Recommended at least once for all adults. For pregnant patients, recommended with each pregnancy. Shingles Vaccine (Shingrix) - COST NOT COVERED BY MEDICARE PART B  2 shot series recommended in those aged 48 and above     Health Maintenance Due:      Topic Date Due   • Hepatitis C Screening  Never done   • Colorectal Cancer Screening  Never done     Immunizations Due:      Topic Date Due   • COVID-19 Vaccine (4 - Pfizer series) 12/10/2021   • Influenza Vaccine (1) Never done     Advance Directives   What are advance directives? Advance directives are legal documents that state your wishes and plans for medical care. These plans are made ahead of time in case you lose your ability to make decisions for yourself. Advance directives can apply to any medical decision, such as the treatments you want, and if you want to donate organs. What are the types of advance directives? There are many types of advance directives, and each state has rules about how to use them. You may choose a combination of any of the following:  · Living will: This is a written record of the treatment you want. You can also choose which treatments you do not want, which to limit, and which to stop at a certain time.  This includes surgery, medicine, IV fluid, and tube feedings. · Durable power of  for healthcare [facility]): This is a written record that states who you want to make healthcare choices for you when you are unable to make them for yourself. This person, called a proxy, is usually a family member or a friend. You may choose more than 1 proxy. · Do not resuscitate (DNR) order:  A DNR order is used in case your heart stops beating or you stop breathing. It is a request not to have certain forms of treatment, such as CPR. A DNR order may be included in other types of advance directives. · Medical directive: This covers the care that you want if you are in a coma, near death, or unable to make decisions for yourself. You can list the treatments you want for each condition. Treatment may include pain medicine, surgery, blood transfusions, dialysis, IV or tube feedings, and a ventilator (breathing machine). · Values history: This document has questions about your views, beliefs, and how you feel and think about life. This information can help others choose the care that you would choose. Why are advance directives important? An advance directive helps you control your care. Although spoken wishes may be used, it is better to have your wishes written down. Spoken wishes can be misunderstood, or not followed. Treatments may be given even if you do not want them. An advance directive may make it easier for your family to make difficult choices about your care. © Copyright [employer] 2018 Information is for End User's use only and may not be sold, redistributed or otherwise used for commercial purposes.  All illustrations and images included in CareNotes® are the copyrighted property of [employer].D.A[employer]M., Inc. or  [employer]

## 2023-10-04 DIAGNOSIS — J06.9 UPPER RESPIRATORY TRACT INFECTION, UNSPECIFIED TYPE: ICD-10-CM

## 2023-10-04 DIAGNOSIS — E29.1 HYPOGONADISM IN MALE: ICD-10-CM

## 2023-10-04 RX ORDER — AZITHROMYCIN 250 MG/1
TABLET, FILM COATED ORAL
Qty: 6 TABLET | Refills: 0 | Status: SHIPPED | OUTPATIENT
Start: 2023-10-04 | End: 2023-10-08

## 2023-10-04 RX ORDER — TESTOSTERONE CYPIONATE 200 MG/ML
200 INJECTION, SOLUTION INTRAMUSCULAR
Qty: 10 ML | Refills: 3 | Status: SHIPPED | OUTPATIENT
Start: 2023-10-04

## 2023-10-04 RX ORDER — TESTOSTERONE GEL, 1% 10 MG/G
50 GEL TRANSDERMAL DAILY
Qty: 90 PACKET | Refills: 3 | Status: SHIPPED | OUTPATIENT
Start: 2023-10-04

## 2023-10-20 ENCOUNTER — HOSPITAL ENCOUNTER (OUTPATIENT)
Dept: RADIOLOGY | Facility: HOSPITAL | Age: 68
Discharge: HOME | End: 2023-10-20

## 2023-10-20 DIAGNOSIS — R10.11 RUQ ABDOMINAL PAIN: ICD-10-CM

## 2023-10-20 DIAGNOSIS — R22.2 SUBCUTANEOUS NODULE OF ABDOMINAL WALL: ICD-10-CM

## 2023-11-10 ENCOUNTER — HOSPITAL ENCOUNTER (OUTPATIENT)
Dept: RADIOLOGY | Facility: HOSPITAL | Age: 68
Discharge: HOME | End: 2023-11-10
Payer: COMMERCIAL

## 2023-11-10 PROCEDURE — G1004 CDSM NDSC: HCPCS

## 2023-11-10 PROCEDURE — 74160 CT ABDOMEN W/CONTRAST: CPT

## 2023-11-10 RX ADMIN — IOHEXOL 100 ML: 350 INJECTION, SOLUTION INTRAVENOUS at 09:23

## 2023-11-15 DIAGNOSIS — K76.89 LIVER NODULE: Primary | ICD-10-CM

## 2023-12-15 ENCOUNTER — HOSPITAL ENCOUNTER (OUTPATIENT)
Dept: RADIOLOGY | Facility: HOSPITAL | Age: 68
Discharge: HOME | End: 2023-12-15
Payer: COMMERCIAL

## 2023-12-15 DIAGNOSIS — K76.89 LIVER NODULE: ICD-10-CM

## 2023-12-15 PROCEDURE — G1004 CDSM NDSC: HCPCS

## 2023-12-15 PROCEDURE — A9585 GADOBUTROL INJECTION: HCPCS

## 2023-12-15 PROCEDURE — 74183 MRI ABD W/O CNTR FLWD CNTR: CPT

## 2023-12-15 RX ORDER — GADOBUTROL 604.72 MG/ML
7 INJECTION INTRAVENOUS
Status: COMPLETED | OUTPATIENT
Start: 2023-12-15 | End: 2023-12-15

## 2023-12-15 RX ADMIN — GADOBUTROL 7 ML: 604.72 INJECTION INTRAVENOUS at 07:22

## 2023-12-21 ENCOUNTER — TELEPHONE (OUTPATIENT)
Dept: INTERNAL MEDICINE CLINIC | Facility: CLINIC | Age: 68
End: 2023-12-21

## 2023-12-21 NOTE — TELEPHONE ENCOUNTER
Good morning. This is Raul Hernandez, [phone]. This is my second request and I spoke to a gal a couple days ago. I am trying to get a copy of the MRI and I needed fax to [phone]. Again this is Raul Hair. Need a copy of my MRI fax to [phone]. Any questions? Please call [phone] Raul LEVIN, date of birth May 19th, 1955. Thank you.

## 2023-12-21 NOTE — TELEPHONE ENCOUNTER
Called pt advised of results wants them faxed pt does not agree with results and will call us back

## 2023-12-21 NOTE — TELEPHONE ENCOUNTER
A user error has taken place: encounter opened in error, closed for administrative reasons.     MRI is back    It shows benign appearing liver cysts    He also has an enlarged abd aorta, appears like an aneurysm.  This may need periodic monitoring.  Recommend he see a vascular specialist to follow this up    Pls enter vascular referral so Raul can follow up for 'abdominal aortic aneurysm'    Pls fax also, if he requests to do so    thanks

## 2024-01-06 LAB
ANA SER QL IF: NEGATIVE
CHOLEST SERPL-MCNC: 208 MG/DL
CHOLEST/HDLC SERPL: 4.2 (CALC)
CRP SERPL-MCNC: 0.4 MG/L
ERYTHROCYTE [SEDIMENTATION RATE] IN BLOOD BY WESTERGREN METHOD: 2 MM/H
HDLC SERPL-MCNC: 50 MG/DL
LDLC SERPL CALC-MCNC: 135 MG/DL (CALC)
NONHDLC SERPL-MCNC: 158 MG/DL (CALC)
TESTOST FREE SERPL-MCNC: 43.7 PG/ML (ref 35–155)
TESTOST SERPL-MCNC: 394 NG/DL (ref 250–1100)
TRIGL SERPL-MCNC: 122 MG/DL
TSH SERPL-ACNC: 2.19 MIU/L (ref 0.4–4.5)

## 2024-01-09 ENCOUNTER — TELEPHONE (OUTPATIENT)
Dept: INTERNAL MEDICINE CLINIC | Facility: CLINIC | Age: 69
End: 2024-01-09

## 2024-01-09 NOTE — TELEPHONE ENCOUNTER
----- Message from Lakesha Zelaya MD sent at 1/2/2024  9:09 AM EST -----  Please call the patient regarding his abnormal result.  Cholesterol worse; def less fats plz. ty

## 2024-01-15 ENCOUNTER — TELEPHONE (OUTPATIENT)
Dept: INTERNAL MEDICINE CLINIC | Facility: CLINIC | Age: 69
End: 2024-01-15

## 2024-01-15 NOTE — TELEPHONE ENCOUNTER
Patient left message on 1/12/24 requesting a call back to schedule an appt with new provider, I called back on 1/12/24 and left a voice mail asking him to give us a call back so we can schedule him.

## 2024-01-18 ENCOUNTER — TELEPHONE (OUTPATIENT)
Dept: INTERNAL MEDICINE CLINIC | Facility: CLINIC | Age: 69
End: 2024-01-18

## 2024-01-18 NOTE — TELEPHONE ENCOUNTER
----- Message from Lakesha Zelaya MD sent at 1/12/2024  8:11 AM EST -----  Please call the patient regarding his normal  testosterone result. ty

## 2024-01-26 ENCOUNTER — OFFICE VISIT (OUTPATIENT)
Dept: URGENT CARE | Age: 69
End: 2024-01-26
Payer: COMMERCIAL

## 2024-01-26 VITALS — HEART RATE: 74 BPM | RESPIRATION RATE: 18 BRPM | OXYGEN SATURATION: 97 % | TEMPERATURE: 98.2 F

## 2024-01-26 DIAGNOSIS — J06.9 ACUTE URI: Primary | ICD-10-CM

## 2024-01-26 DIAGNOSIS — R05.1 ACUTE COUGH: ICD-10-CM

## 2024-01-26 PROCEDURE — 99213 OFFICE O/P EST LOW 20 MIN: CPT

## 2024-01-26 PROCEDURE — S9088 SERVICES PROVIDED IN URGENT: HCPCS

## 2024-01-26 RX ORDER — AZITHROMYCIN 250 MG/1
TABLET, FILM COATED ORAL
Qty: 6 TABLET | Refills: 0 | Status: SHIPPED | OUTPATIENT
Start: 2024-01-26 | End: 2024-01-30

## 2024-01-26 NOTE — PROGRESS NOTES
[facility]        NAME: Raul Feliz is a 68 y.o. male  : 1955    MRN: [id number]  DATE: 2024  TIME: 9:02 AM    Assessment and Plan   Acute URI [J06.9]  1. Acute URI  azithromycin (ZITHROMAX) 250 mg tablet      2. Acute cough  azithromycin (ZITHROMAX) 250 mg tablet        Patient refused POCT rapid covid and PCR covid/flu.    Patient Instructions     Start antibiotic as prescribed  Vitamin D3 2000 IU daily  Vitamin C 1000mg twice per day  Multivitamin daily  Fluids and rest  Over the counter cold medication as needed (EX: Coricidin HBP, tylenol/motrin)  Follow up with PCP in 3-5 days.  Proceed to ER if symptoms worsen.    Eat yogurt with live and active cultures and/or take a probiotic at least 3 hours before or after antibiotic dose. Monitor stool for diarrhea and/or blood. If this occurs, contact primary care doctor ASAP.    Chief Complaint     Chief Complaint   Patient presents with    Cold Like Symptoms     Patient relates runny nose, congestion, sore throat. Started 3 days ago. Denies fevers. Requesting z-pack.          History of Present Illness       Patient is a 67 yo male with no significant PMH presenting in the clinic today for cold sx x 3 days. Admits sore throat, productive cough, congestion, and rhinorrhea. Denies fever, chills, body aches, fatigue, headache, dizziness, chest pain, SOB, abdominal pain, n/v/d. Denies the use of OTC tx for sx management. Denies recent sick contacts although patient notes he visits a nursing home daily. Denies h/o cardiac conditions. Admits h/o prior smoking for 20 years and 2 ppd.        Review of Systems   Review of Systems   Constitutional:  Negative for chills, fatigue and fever.   HENT:  Positive for congestion, rhinorrhea and sore throat. Negative for ear pain, postnasal drip, sinus pressure and sinus pain.    Respiratory:  Positive for cough. Negative for shortness of breath.    Cardiovascular:  Negative for chest pain.    Gastrointestinal:  Negative for abdominal pain, diarrhea, nausea and vomiting.   Musculoskeletal:  Negative for myalgias.   Skin:  Negative for rash.   Neurological:  Negative for headaches.         Current Medications       Current Outpatient Medications:     azithromycin (ZITHROMAX) 250 mg tablet, Take 2 tablets today then 1 tablet daily x 4 days, Disp: 6 tablet, Rfl: 0    cholecalciferol (VITAMIN D3) 1,000 units tablet, Take 1,000 Units by mouth daily, Disp: , Rfl:     co-enzyme Q-10 30 MG capsule, Take 30 mg by mouth 3 (three) times a day, Disp: , Rfl:     testosterone cypionate (DEPO-TESTOSTERONE) 200 mg/mL SOLN, Inject 1 mL (200 mg total) into a muscle every 5 (five) days, Disp: 10 mL, Rfl: 3    triamcinolone (KENALOG) 0.1 % oral topical paste, Apply 1 application topically daily at bedtime, Disp: 5 g, Rfl: 0    Magnesium Gluconate 550 MG TABS, Take 30 mg by mouth daily (Patient not taking: Reported on 10/3/2023), Disp: , Rfl:     Multiple Vitamin (MULTIVITAMIN) capsule, Take 1 capsule by mouth daily (Patient not taking: Reported on 10/3/2023), Disp: , Rfl:     Na Sulfate-K Sulfate-Mg Sulf 17.5-3.13-1.6 GM/177ML SOLN, TAKE 177ML BY MOUTH AS DIRECTED (Patient not taking: Reported on 10/3/2023), Disp: , Rfl:     omeprazole (PriLOSEC) 40 MG capsule, TAKE 1 CAPSULE BY MOUTH EVERY DAY 30 MINUTES BEFORE BREAKFAST (Patient not taking: Reported on 10/3/2023), Disp: , Rfl:     testosterone (ANDROGEL) 1%, Apply 1 packet (50 mg total) topically daily Take only on days when not taking the testosterone injection, Disp: 90 packet, Rfl: 3    Current Allergies     Allergies as of 01/26/2024    (No Known Allergies)            The following portions of the patient's history were reviewed and updated as appropriate: allergies, current medications, past family history, past medical history, past social history, past surgical history and problem list.     Past Medical History:   Diagnosis Date    Ganglion     Hiatal hernia      Hypogonadism in male     Lump or mass in breast        Past Surgical History:   Procedure Laterality Date    BREAST BIOPSY      RI EXC LESION TENDON SHEATH/CAPSULE W/SYNVCT FOOT Left 12/2/2016    Procedure: EXCISION FIBROMA;  Surgeon: Foreign Gallo DPM;  Location: [facility];  Service: Podiatry       Family History   Problem Relation Age of Onset    Dementia Mother     COPD Mother     Hypertension Mother     Coronary artery disease Father     COPD Father     Hypertension Father     Lung cancer Sister          Medications have been verified.        Objective   Pulse 74   Temp 98.2 °F (36.8 °C)   Resp 18   SpO2 97%        Physical Exam     Physical Exam  Vitals reviewed.   Constitutional:       General: He is not in acute distress.     Appearance: Normal appearance. He is normal weight. He is not ill-appearing.   HENT:      Head: Normocephalic.      Right Ear: Hearing, tympanic membrane, ear canal and external ear normal. No middle ear effusion. There is no impacted cerumen. Tympanic membrane is not erythematous or bulging.      Left Ear: Hearing, tympanic membrane, ear canal and external ear normal.  No middle ear effusion. There is no impacted cerumen. Tympanic membrane is not erythematous or bulging.      Nose: Nose normal. No congestion or rhinorrhea.      Right Sinus: No maxillary sinus tenderness or frontal sinus tenderness.      Left Sinus: No maxillary sinus tenderness or frontal sinus tenderness.      Mouth/Throat:      Lips: Pink.      Mouth: Mucous membranes are moist.      Pharynx: Oropharynx is clear. Uvula midline. No pharyngeal swelling, oropharyngeal exudate, posterior oropharyngeal erythema or uvula swelling.      Tonsils: No tonsillar exudate or tonsillar abscesses. 1+ on the right. 1+ on the left.   Eyes:      General:         Right eye: No discharge.         Left eye: No discharge.      Conjunctiva/sclera: Conjunctivae normal.   Cardiovascular:      Rate and Rhythm: Normal rate and regular  rhythm.      Pulses: Normal pulses.      Heart sounds: Normal heart sounds. No murmur heard.     No friction rub. No gallop.   Pulmonary:      Effort: Pulmonary effort is normal. No respiratory distress.      Breath sounds: No stridor. Rhonchi present. No wheezing or rales.   Musculoskeletal:      Cervical back: Normal range of motion and neck supple. No tenderness.   Lymphadenopathy:      Cervical: No cervical adenopathy.   Skin:     General: Skin is warm.      Findings: No rash.   Neurological:      Mental Status: He is alert.   Psychiatric:         Mood and Affect: Mood normal.         Behavior: Behavior normal.

## 2024-01-26 NOTE — PATIENT INSTRUCTIONS
Start antibiotic as prescribed  Vitamin D3 2000 IU daily  Vitamin C 1000mg twice per day  Multivitamin daily  Fluids and rest  Over the counter cold medication as needed (EX: Coricidin HBP, tylenol/motrin)  Follow up with PCP in 3-5 days.  Proceed to ER if symptoms worsen.    Eat yogurt with live and active cultures and/or take a probiotic at least 3 hours before or after antibiotic dose. Monitor stool for diarrhea and/or blood. If this occurs, contact primary care doctor ASAP.

## 2024-03-09 ENCOUNTER — OFFICE VISIT (OUTPATIENT)
Dept: URGENT CARE | Age: 69
End: 2024-03-09
Payer: COMMERCIAL

## 2024-03-09 VITALS
HEART RATE: 99 BPM | TEMPERATURE: 97.9 F | BODY MASS INDEX: 22.35 KG/M2 | SYSTOLIC BLOOD PRESSURE: 129 MMHG | RESPIRATION RATE: 18 BRPM | OXYGEN SATURATION: 99 % | DIASTOLIC BLOOD PRESSURE: 86 MMHG | HEIGHT: 72 IN | WEIGHT: 165 LBS

## 2024-03-09 DIAGNOSIS — J06.9 ACUTE URI: Primary | ICD-10-CM

## 2024-03-09 PROCEDURE — 99213 OFFICE O/P EST LOW 20 MIN: CPT

## 2024-03-09 PROCEDURE — S9088 SERVICES PROVIDED IN URGENT: HCPCS

## 2024-03-09 NOTE — PROGRESS NOTES
[facility]        NAME: Raul Feliz is a 68 y.o. male  : 1955    MRN: [id number]  DATE: 2024  TIME: 8:37 AM    Assessment and Plan   Acute URI [J06.9]  1. Acute URI          Congestion, PND, coughing ( worse at night) since wed. No fevers. No SOB/Wheezing. Requesting z-remy. Discussed likely viral in etiology and benefit of treating symptoms vz abx . After great length discussion, pt agreeable to attempting OTC treatments first. Discussed f/u for worsening    Pt called 3/10 stating symptoms have worsened and again requesting zpak- advised again likely viral and to continue OTC medications however will still send zpak.    Patient Instructions       Follow up with PCP in 3-5 days.  Proceed to  ER if symptoms worsen.    If tests have been performed at [facility], our office will contact you with results if changes need to be made to the care plan discussed with you at the visit.  You can review your full results on [facility] MyChart.    Chief Complaint     Chief Complaint   Patient presents with    Cough     Cough, nasal congestion, chest congestion x 3 days         History of Present Illness       Congestion, PND, coughing ( worse at night) since wed. No fevers. No SOB/Wheezing. Requesting z-remy. Discussed likely viral in etiology and benefit of treating symptoms vz abx . After great length discussion, pt agreeable to attempting OTC treatments first. Discussed f/u for worsening    Cough  Associated symptoms include postnasal drip. Pertinent negatives include no fever, shortness of breath or wheezing.       Review of Systems   Review of Systems   Constitutional:  Negative for fever.   HENT:  Positive for congestion and postnasal drip.    Respiratory:  Positive for cough. Negative for shortness of breath and wheezing.          Current Medications       Current Outpatient Medications:     cholecalciferol (VITAMIN D3) 1,000 units tablet, Take 1,000 Units by mouth daily, Disp: , Rfl:      co-enzyme Q-10 30 MG capsule, Take 30 mg by mouth 3 (three) times a day, Disp: , Rfl:     triamcinolone (KENALOG) 0.1 % oral topical paste, Apply 1 application topically daily at bedtime, Disp: 5 g, Rfl: 0    Magnesium Gluconate 550 MG TABS, Take 30 mg by mouth daily (Patient not taking: Reported on 10/3/2023), Disp: , Rfl:     Multiple Vitamin (MULTIVITAMIN) capsule, Take 1 capsule by mouth daily (Patient not taking: Reported on 10/3/2023), Disp: , Rfl:     Na Sulfate-K Sulfate-Mg Sulf 17.5-3.13-1.6 GM/177ML SOLN, TAKE 177ML BY MOUTH AS DIRECTED (Patient not taking: Reported on 10/3/2023), Disp: , Rfl:     omeprazole (PriLOSEC) 40 MG capsule, TAKE 1 CAPSULE BY MOUTH EVERY DAY 30 MINUTES BEFORE BREAKFAST (Patient not taking: Reported on 10/3/2023), Disp: , Rfl:     testosterone (ANDROGEL) 1%, Apply 1 packet (50 mg total) topically daily Take only on days when not taking the testosterone injection (Patient not taking: Reported on 3/9/2024), Disp: 90 packet, Rfl: 3    testosterone cypionate (DEPO-TESTOSTERONE) 200 mg/mL SOLN, Inject 1 mL (200 mg total) into a muscle every 5 (five) days (Patient not taking: Reported on 3/9/2024), Disp: 10 mL, Rfl: 3    Current Allergies     Allergies as of 03/09/2024    (No Known Allergies)            The following portions of the patient's history were reviewed and updated as appropriate: allergies, current medications, past family history, past medical history, past social history, past surgical history and problem list.     Past Medical History:   Diagnosis Date    Ganglion     Hiatal hernia     Hypogonadism in male     Lump or mass in breast        Past Surgical History:   Procedure Laterality Date    BREAST BIOPSY      VA EXC LESION TENDON SHEATH/CAPSULE W/SYNVCT FOOT Left 12/2/2016    Procedure: EXCISION FIBROMA;  Surgeon: Foreign Gallo DPM;  Location: [facility];  Service: Podiatry       Family History   Problem Relation Age of Onset    Dementia Mother     COPD Mother      Hypertension Mother     Coronary artery disease Father     COPD Father     Hypertension Father     Lung cancer Sister          Medications have been verified.        Objective   /86   Pulse 99   Temp 97.9 °F (36.6 °C)   Resp 18   Ht 6' (1.829 m)   Wt 74.8 kg (165 lb)   SpO2 99%   BMI 22.38 kg/m²   No LMP for male patient.       Physical Exam     Physical Exam  Vitals reviewed.   Constitutional:       Appearance: Normal appearance.   HENT:      Nose: Congestion present.      Mouth/Throat:      Pharynx: No posterior oropharyngeal erythema.   Cardiovascular:      Rate and Rhythm: Normal rate and regular rhythm.      Pulses: Normal pulses.      Heart sounds: Normal heart sounds.   Pulmonary:      Effort: Pulmonary effort is normal.      Breath sounds: Normal breath sounds.   Lymphadenopathy:      Cervical: No cervical adenopathy.   Neurological:      Mental Status: He is alert.

## 2024-03-10 RX ORDER — AZITHROMYCIN 250 MG/1
TABLET, FILM COATED ORAL
Qty: 6 TABLET | Refills: 0 | Status: SHIPPED | OUTPATIENT
Start: 2024-03-10 | End: 2024-03-14

## 2024-03-17 LAB
B BURGDOR AB SER IA-ACNC: <0.9 INDEX
CHOLEST SERPL-MCNC: 165 MG/DL
CHOLEST/HDLC SERPL: 4.5 (CALC)
CRP SERPL-MCNC: 20.2 MG/L
ERYTHROCYTE [SEDIMENTATION RATE] IN BLOOD BY WESTERGREN METHOD: 9 MM/H
HDLC SERPL-MCNC: 37 MG/DL
LDLC SERPL CALC-MCNC: 106 MG/DL (CALC)
NONHDLC SERPL-MCNC: 128 MG/DL (CALC)
TESTOST FREE SERPL-MCNC: 55.6 PG/ML (ref 35–155)
TESTOST SERPL-MCNC: 415 NG/DL (ref 250–1100)
TRIGL SERPL-MCNC: 120 MG/DL
TSH SERPL-ACNC: 1.64 MIU/L (ref 0.4–4.5)

## 2024-03-19 LAB
ANA PAT SER IF-IMP: ABNORMAL
ANA PAT SER-IMP: ABNORMAL
ANA SER QL IF: POSITIVE
ANA TITR SER IF: ABNORMAL TITER
ANA TITR SER IF: ABNORMAL TITER
B BURGDOR AB SER IA-ACNC: <0.9 INDEX
CHOLEST SERPL-MCNC: 165 MG/DL
CHOLEST/HDLC SERPL: 4.5 (CALC)
CRP SERPL-MCNC: 20.2 MG/L
ERYTHROCYTE [SEDIMENTATION RATE] IN BLOOD BY WESTERGREN METHOD: 9 MM/H
HDLC SERPL-MCNC: 37 MG/DL
LDLC SERPL CALC-MCNC: 106 MG/DL (CALC)
NONHDLC SERPL-MCNC: 128 MG/DL (CALC)
TESTOST FREE SERPL-MCNC: 55.6 PG/ML (ref 35–155)
TESTOST SERPL-MCNC: 415 NG/DL (ref 250–1100)
TRIGL SERPL-MCNC: 120 MG/DL
TSH SERPL-ACNC: 1.64 MIU/L (ref 0.4–4.5)

## 2024-04-02 DIAGNOSIS — R79.82 ELEVATED C-REACTIVE PROTEIN (CRP): ICD-10-CM

## 2024-04-02 DIAGNOSIS — R76.8 POSITIVE ANA (ANTINUCLEAR ANTIBODY): Primary | ICD-10-CM

## 2024-04-06 LAB — CRP SERPL-MCNC: 0.9 MG/L

## 2024-04-08 LAB
ANA PAT SER IF-IMP: ABNORMAL
ANA PAT SER-IMP: ABNORMAL
ANA SER QL IF: POSITIVE
ANA TITR SER IF: ABNORMAL TITER
ANA TITR SER IF: ABNORMAL TITER
CRP SERPL-MCNC: 0.9 MG/L

## 2024-04-12 ENCOUNTER — TELEPHONE (OUTPATIENT)
Dept: FAMILY MEDICINE CLINIC | Facility: CLINIC | Age: 69
End: 2024-04-12

## 2024-04-12 ENCOUNTER — OFFICE VISIT (OUTPATIENT)
Dept: FAMILY MEDICINE CLINIC | Facility: CLINIC | Age: 69
End: 2024-04-12

## 2024-04-12 VITALS
BODY MASS INDEX: 22.51 KG/M2 | TEMPERATURE: 98 F | SYSTOLIC BLOOD PRESSURE: 133 MMHG | WEIGHT: 166 LBS | OXYGEN SATURATION: 98 % | DIASTOLIC BLOOD PRESSURE: 77 MMHG | HEART RATE: 84 BPM

## 2024-04-12 DIAGNOSIS — K76.89 LIVER CYST: ICD-10-CM

## 2024-04-12 DIAGNOSIS — D18.03 LIVER HEMANGIOMA: ICD-10-CM

## 2024-04-12 DIAGNOSIS — I65.23 CAROTID ARTERY PLAQUE, BILATERAL: ICD-10-CM

## 2024-04-12 DIAGNOSIS — I71.40 ABDOMINAL ANEURYSM (HCC): ICD-10-CM

## 2024-04-12 DIAGNOSIS — E29.1 HYPOGONADISM IN MALE: ICD-10-CM

## 2024-04-12 DIAGNOSIS — R76.8 POSITIVE ANA (ANTINUCLEAR ANTIBODY): ICD-10-CM

## 2024-04-12 DIAGNOSIS — E78.2 MODERATE MIXED HYPERLIPIDEMIA NOT REQUIRING STATIN THERAPY: ICD-10-CM

## 2024-04-12 DIAGNOSIS — E29.1 HYPOGONADISM IN MALE: Primary | ICD-10-CM

## 2024-04-12 RX ORDER — TESTOSTERONE 10 MG/G
12.5 GEL TOPICAL DAILY
Qty: 360 G | Refills: 2 | Status: SHIPPED | OUTPATIENT
Start: 2024-04-12

## 2024-04-12 RX ORDER — TESTOSTERONE 10 MG/G
12.5 GEL TOPICAL DAILY
Qty: 360 G | Refills: 2 | Status: SHIPPED | OUTPATIENT
Start: 2024-04-12 | End: 2024-04-12 | Stop reason: SDUPTHER

## 2024-04-12 RX ORDER — TESTOSTERONE CYPIONATE 200 MG/ML
200 INJECTION, SOLUTION INTRAMUSCULAR
Qty: 10 ML | Refills: 3 | Status: SHIPPED | OUTPATIENT
Start: 2024-04-12

## 2024-04-12 NOTE — PROGRESS NOTES
Name: Raul Feliz      : 1955      MRN: [id number]  Encounter Provider: Lakesha Zelaya MD  Encounter Date: 2024   Encounter department: [facility]    Assessment & Plan     1. Hypogonadism in male  -     testosterone cypionate (DEPO-TESTOSTERONE) 200 mg/mL SOLN; Inject 1 mL (200 mg total) into a muscle every 5 (five) days  -     Ambulatory Referral to Cardiology; Future    2. Moderate mixed hyperlipidemia not requiring statin therapy    3. Liver cyst    4. Abdominal aneurysm (HCC)  -     Ambulatory Referral to Vascular Surgery; Future    5. Positive XIMENA (antinuclear antibody)  -     Ambulatory Referral to Rheumatology; Future    6. Liver hemangioma    7. Carotid artery plaque, bilateral  -     Ambulatory Referral to Cardiology; Future  -     VAS carotid complete study; Future; Expected date: 2024      Regarding his hypogonadism, low testosterone level history, discussed with patient.  Patient is refilled his topical and injectable form as requested.  Patient will only use his topical and see if he does not need the injection as well.  Discussed with patient and patient education regarding any cardiac issues with set supplementation.  Therefore patient is also referred to cardiologist to get stress test etc. because he will continue his testosterone supplementation.  Regarding his hyperlipidemia, patient recently had blood work done.  His total cholesterol went down to 165 from 208, HDL went down to 37 from 58, triglycerides went down to 120 from 122 and his LDL went down to 106 from 135.  Patient advised to cut down on his fats and starches.  Continue diet and exercise as tolerated.  Regarding his liver cyst on the MRI, discussed with patient.  Will continue to monitor.  Regarding his abdominal aortic aneurysm, discussed with patient.  Patient advised not to lift push or pull anything heavy and also to make sure he is not constipated so he does not strain.   Patient also made aware that if he has any abdominal pain that he should get checked out in the emergency room right away especially if is acute and sudden.  Patient also sent to vascular surgery to be evaluated.  Regarding his positive XIMENA, his titer was 1 in 1280 this month and last month, discussed with patient.  Patient advised to see rheumatology to be evaluated as to find out where this could be coming from.  Of note his repeat CRP was normal.  And his sed rate back in March was also normal.  Regarding his liver hemangioma that was confirmed by the MRI that he did, discussed with patient.  And we will continue to monitor.  Regarding his bilateral carotid artery history, discussed with patient.  Patient education.  Patient also sent for bilateral carotid Doppler.  Of note patient visit lasted for at least 60 minutes of face-to-face time and discussion with me in the exam room.  RTO 3 months for his annual physical.      Depression Screening and Follow-up Plan: Patient was screened for depression during today's encounter. They screened negative with a PHQ-2 score of 0.        Subjective      68-year-old patient, new patient to me because his previous PCP left our practice, here for an evaluation of his low testosterone history, hyperlipidemia and a recent positive XIMENA and an elevation in his CRP levels.  Patient is requesting a refill on his testosterone topical supplementation and his injectable form.  Patient wants a higher dose of the topical form and that specific 1 to see if that will be sufficient and he does not need to take the injectable form like he has been doing in the past.  Patient denies any cardiovascular or neuro symptoms with it.  Patient denies tobacco.  Patient is due for a colonoscopy but says he will not go for 1 now.  Patient has been seen by a urologist and an endocrinologist in the past.  Per patient patient says he is recently had stress test for his life insurance and that it was  normal.  Patient also says that he does not have access to getting the results.  Patient has a history of bilateral carotid plaque and had a Doppler in 2021.  Patient also has some liver cyst including a liver hemangioma and an abdominal aortic aneurysm.  These findings were discovered on his MRI of the abdomen which was ordered after he had the CT of the abdomen pelvis which showed liver lesions and a CT was ordered because he had complained about a lump on his right upper quadrant.  Patient also has history of lipomas as per patient.      Review of Systems   Constitutional:  Positive for fatigue. Negative for activity change, appetite change, chills, fever and unexpected weight change.   HENT:  Positive for hearing loss. Negative for congestion and sore throat.         Uses bilateral hearing aids but has the left 1 in today only.   Eyes:  Negative for visual disturbance.   Respiratory:  Negative for cough and shortness of breath.    Cardiovascular:  Negative for chest pain and palpitations.   Gastrointestinal:  Negative for abdominal pain, blood in stool, constipation, diarrhea, nausea and vomiting.   Genitourinary:  Negative for dysuria and hematuria.   Musculoskeletal:  Negative for arthralgias.   Skin:  Negative for rash.   Neurological:  Negative for dizziness and headaches.   Psychiatric/Behavioral:  Negative for dysphoric mood and sleep disturbance. The patient is not nervous/anxious.        Current Outpatient Medications on File Prior to Visit   Medication Sig   • cholecalciferol (VITAMIN D3) 1,000 units tablet Take 1,000 Units by mouth daily   • co-enzyme Q-10 30 MG capsule Take 30 mg by mouth 3 (three) times a day   • [DISCONTINUED] testosterone (ANDROGEL) 1% Apply 1 packet (50 mg total) topically daily Take only on days when not taking the testosterone injection   • [DISCONTINUED] testosterone cypionate (DEPO-TESTOSTERONE) 200 mg/mL SOLN Inject 1 mL (200 mg total) into a muscle every 5 (five) days   •  Magnesium Gluconate 550 MG TABS Take 30 mg by mouth daily (Patient not taking: Reported on 10/3/2023)   • Multiple Vitamin (MULTIVITAMIN) capsule Take 1 capsule by mouth daily (Patient not taking: Reported on 10/3/2023)   • Na Sulfate-K Sulfate-Mg Sulf 17.5-3.13-1.6 GM/177ML SOLN    • omeprazole (PriLOSEC) 40 MG capsule TAKE 1 CAPSULE BY MOUTH EVERY DAY 30 MINUTES BEFORE BREAKFAST (Patient not taking: Reported on 10/3/2023)   • triamcinolone (KENALOG) 0.1 % oral topical paste Apply 1 application topically daily at bedtime (Patient not taking: Reported on 4/12/2024)       Objective     /77 (BP Location: Left arm, Patient Position: Sitting, Cuff Size: Standard)   Pulse 84   Temp 98 °F (36.7 °C)   Wt 75.3 kg (166 lb)   SpO2 98%   BMI 22.51 kg/m²     Physical Exam  Vitals reviewed.   Constitutional:       Appearance: Normal appearance. He is normal weight.   HENT:      Head: Normocephalic and atraumatic.      Right Ear: External ear normal.      Left Ear: External ear normal.      Mouth/Throat:      Mouth: Mucous membranes are moist.      Pharynx: Oropharynx is clear.   Eyes:      Extraocular Movements: Extraocular movements intact.      Conjunctiva/sclera: Conjunctivae normal.   Neck:      Vascular: No carotid bruit.   Cardiovascular:      Rate and Rhythm: Normal rate and regular rhythm.   Pulmonary:      Effort: Pulmonary effort is normal.      Breath sounds: Normal breath sounds.   Abdominal:      General: Bowel sounds are normal.      Palpations: Abdomen is soft.      Tenderness: There is no abdominal tenderness. There is no right CVA tenderness or left CVA tenderness.   Musculoskeletal:         General: No tenderness.      Cervical back: Neck supple.      Right lower leg: No edema.      Left lower leg: No edema.      Comments: No calf tenderness bilateral.   Lymphadenopathy:      Cervical: No cervical adenopathy.   Skin:     General: Skin is warm.      Findings: No rash.   Neurological:      General: No  focal deficit present.      Mental Status: He is alert and oriented to person, place, and time.   Psychiatric:         Mood and Affect: Mood normal.         Behavior: Behavior normal.         Thought Content: Thought content normal.       Lakesha Zelaya MD

## 2024-04-12 NOTE — TELEPHONE ENCOUNTER
[facility] she says patient's prescription for the Testosterone will need to be re-sent. Patient reports using 1 pump on each arm daily. She is asking if this prescription can be changed to read place 1 pump on each arm daily.

## 2024-04-17 ENCOUNTER — TELEPHONE (OUTPATIENT)
Dept: FAMILY MEDICINE CLINIC | Facility: CLINIC | Age: 69
End: 2024-04-17

## 2024-04-17 NOTE — TELEPHONE ENCOUNTER
Patient called and stated that he spoke with the cardiology they want you to order the stress test first. Then he can call and schedule an appointment.

## 2024-04-18 DIAGNOSIS — R06.09 DOE (DYSPNEA ON EXERTION): Primary | ICD-10-CM

## 2024-04-19 NOTE — TELEPHONE ENCOUNTER
Called and spoke with patient, made him aware of the referral placed. Patient says he will call back on Monday with the name of the cardiologist.

## 2024-04-23 ENCOUNTER — CONSULT (OUTPATIENT)
Dept: RHEUMATOLOGY | Facility: CLINIC | Age: 69
End: 2024-04-23
Payer: COMMERCIAL

## 2024-04-23 VITALS
SYSTOLIC BLOOD PRESSURE: 128 MMHG | WEIGHT: 171 LBS | HEIGHT: 72 IN | OXYGEN SATURATION: 93 % | DIASTOLIC BLOOD PRESSURE: 86 MMHG | HEART RATE: 63 BPM | BODY MASS INDEX: 23.16 KG/M2

## 2024-04-23 DIAGNOSIS — R76.8 POSITIVE ANA (ANTINUCLEAR ANTIBODY): ICD-10-CM

## 2024-04-23 PROCEDURE — 99204 OFFICE O/P NEW MOD 45 MIN: CPT | Performed by: INTERNAL MEDICINE

## 2024-04-23 RX ORDER — LEVOFLOXACIN 500 MG/1
500 TABLET, FILM COATED ORAL DAILY
COMMUNITY
Start: 2024-02-01 | End: 2024-04-23

## 2024-04-23 RX ORDER — CODEINE PHOSPHATE AND GUAIFENESIN 10; 100 MG/5ML; MG/5ML
SOLUTION ORAL
COMMUNITY
Start: 2024-02-01 | End: 2024-04-23

## 2024-04-23 NOTE — PROGRESS NOTES
"  This is a Rheumatology Consult seen at the request of Dr. Zelaya      HPI: this is a 67 y/o male who presents for further evaluation + XIMENA. He has past medical history hypogonadism, liver cyst, HLD, abdominal aneurysm, liver hemangioma, carotid artery plaque    He was taking care of sister and mother with hospice care.    A few months ago he was dx with COVID, flu and RSV    Since then he has had severe fatigue for the past 4 months    Prior to that he mentions he was physically fit and did not have significant fatigue    He feels this fatigue is getting better 'I feel stronger\"    As part of w/u XIMENA was checked and positive    He is being monitored for aortic aneurysm    Mild arthralgias hands, elbows worse on the days he's working out Gym    Denies rashes, Raynaud's, no seizures or strokes, Pes/DVTs, renal failure, pleural-pericardial effusion        --------------------------------------------------------------------------------------------------------        ROS:        - for: Fevers, Chills or sweats.  No HAs or scalp tenderness.  No jaw claudication.  No acute visual or eye changes.  No dry eyes.  No auditory complaints.  No oral lesions or ulcers.  No dry mouth.  No sore throat or cough.  No chest pains or palpitations.  No shortness of breath, dyspnea on exertion or wheezing.  No hemotpysis.  No abdominal pain, GERD symptoms, diarrhea or constipation.  No urinary complaints.  No numbness, tingling or weakness.  No rashes.    All other ROS was reviewed and negative except as above         --------------------------------------------------------------------------------------------------------    Past Medical History    Past Medical History:   Diagnosis Date    Ganglion     Hiatal hernia     Hypogonadism in male     Lump or mass in breast            Past Surgical History    Past Surgical History:   Procedure Laterality Date    BREAST BIOPSY      SC EXC LESION TENDON SHEATH/CAPSULE W/SYNVCT FOOT Left " 2016    Procedure: EXCISION FIBROMA;  Surgeon: Foreign Gallo DPM;  Location: [facility];  Service: Podiatry           Family History    Family History   Problem Relation Age of Onset    Dementia Mother     COPD Mother     Hypertension Mother     Coronary artery disease Father     COPD Father     Hypertension Father     Lung cancer Sister             Social History    Social History     Tobacco Use    Smoking status: Former     Current packs/day: 0.00     Average packs/day: 0.5 packs/day for 20.0 years (10.0 ttl pk-yrs)     Types: Cigarettes     Start date:      Quit date:      Years since quittin.3    Smokeless tobacco: Never    Tobacco comments:     socially   Vaping Use    Vaping status: Never Used   Substance Use Topics    Alcohol use: Not Currently     Comment: Rare     Drug use: Never     Owns      Allergies    No Known Allergies      Medications    Current Outpatient Medications   Medication Instructions    cholecalciferol 1,000 Units, Oral, Daily    co-enzyme Q-10 30 mg, Oral, 3 times daily    Magnesium Gluconate 30 mg, Oral, Daily    Multiple Vitamin (MULTIVITAMIN) capsule 1 capsule, Daily    Na Sulfate-K Sulfate-Mg Sulf 17.5-3.13-1.6 GM/177ML SOLN     testosterone cypionate (DEPO-TESTOSTERONE) 200 mg, Intramuscular, Every 5 Days    Testosterone 12.5 mg/day, Transdermal, Daily, 1 pump on each arm daily          Physical Exam    /86   Pulse 63   Ht 6' (1.829 m)   Wt 77.6 kg (171 lb)   SpO2 93%   BMI 23.19 kg/m²     GEN: AAO, No apparent distress.  Patient is well developed.  HEENT:  Pupils are equal, round and reactive.  Sclera are clear.  Fundoscopic exam is normal.  External ears are without lesions.  Oral pharynx is clear of ulcers or other lesions.  MMM.   NECK:  Supple.  There is no adenopathy appreciable in anterior or posterior cervical chains or supraclavicularly.  JVP is normal.    HEART: Regular rate and rhythm.  There is no appreciable murmur, gallop  or rub.  LUNGS: Clear to auscultation.  ABD:  Soft, without tenderness, rebound or guarding.  No appreciable organomegally.  NEURO: Speech and cognition are normal.  Strength is 5/5 throughout.  Tone is normal.  DTRs are 2/4 at the knees, ankles and elbows.  Gait is normal.  SKIN: There are no rashes or lesions    MUSCULOSKELETAL:   + paloma OA      ________________________________________________________________________          Results Review      Component      Latest Ref Rng 4/5/2024   SL AMB XIMENA TITER      titer 1:1280 (H)    SL AMB XIMENA PATTERN Nuclear, Speckled !    XIMENA Titer 1      titer 1:1280 (H)    XIMENA Pattern 1 Nuclear, Homogeneous !       Legend:  (H) High  ! Abnormal    Impressions and Plans:    This is a 69 y/o male with h/o hypogonadism and onset of fatigue after he had COVID/RSV/Flu    As part of w/u XIMENA was checked and found to be positive    Clinically no other s/s suggestive inflammatory arthritis/CTD    Suspect false positive    Check complete XIMENA profile    Paloma OA hands noted, tylenol as needed    Will review test results and f/u as needed              Thank you for involving me in this patient's care.        He Fleming MD  [facility] Rheumatology

## 2024-05-03 ENCOUNTER — HOSPITAL ENCOUNTER (OUTPATIENT)
Dept: NON INVASIVE DIAGNOSTICS | Facility: CLINIC | Age: 69
Discharge: HOME | End: 2024-05-03
Payer: COMMERCIAL

## 2024-05-03 VITALS
HEIGHT: 72 IN | BODY MASS INDEX: 23.16 KG/M2 | HEART RATE: 61 BPM | WEIGHT: 171 LBS | SYSTOLIC BLOOD PRESSURE: 136 MMHG | OXYGEN SATURATION: 97 % | DIASTOLIC BLOOD PRESSURE: 82 MMHG

## 2024-05-03 DIAGNOSIS — R06.09 DOE (DYSPNEA ON EXERTION): ICD-10-CM

## 2024-05-03 LAB
MAX HR PERCENT: 82 %
MAX HR: 125 BPM
RATE PRESSURE PRODUCT: NORMAL
SL CV STRESS RECOVERY BP: NORMAL MMHG
SL CV STRESS RECOVERY HR: 77 BPM
SL CV STRESS RECOVERY O2 SAT: 100 %
SL CV STRESS STAGE REACHED: 4
STRESS ANGINA INDEX: 0
STRESS BASELINE BP: NORMAL MMHG
STRESS BASELINE HR: 61 BPM
STRESS O2 SAT REST: 97 %
STRESS PEAK HR: 125 BPM
STRESS POST ESTIMATED WORKLOAD: 13.4 METS
STRESS POST EXERCISE DUR MIN: 10 MIN
STRESS POST EXERCISE DUR SEC: 45 SEC
STRESS POST O2 SAT PEAK: 97 %
STRESS POST PEAK BP: 160 MMHG

## 2024-05-03 PROCEDURE — 93017 CV STRESS TEST TRACING ONLY: CPT

## 2024-05-03 PROCEDURE — 93016 CV STRESS TEST SUPVJ ONLY: CPT | Performed by: INTERNAL MEDICINE

## 2024-05-03 PROCEDURE — 93018 CV STRESS TEST I&R ONLY: CPT | Performed by: INTERNAL MEDICINE

## 2024-05-05 LAB
CHEST PAIN STATEMENT: NORMAL
MAX DIASTOLIC BP: 90 MMHG
MAX PREDICTED HEART RATE: 152 BPM
PROTOCOL NAME: NORMAL
REASON FOR TERMINATION: NORMAL
STRESS POST EXERCISE DUR MIN: 10 MIN
STRESS POST EXERCISE DUR SEC: 45 SEC
STRESS POST PEAK HR: 125 BPM
STRESS POST PEAK SYSTOLIC BP: 160 MMHG
TARGET HR FORMULA: NORMAL
TEST INDICATION: NORMAL

## 2024-05-10 ENCOUNTER — HOSPITAL ENCOUNTER (OUTPATIENT)
Dept: NON INVASIVE DIAGNOSTICS | Facility: CLINIC | Age: 69
Discharge: HOME | End: 2024-05-10
Payer: COMMERCIAL

## 2024-05-10 DIAGNOSIS — I65.23 CAROTID ARTERY PLAQUE, BILATERAL: ICD-10-CM

## 2024-05-10 PROCEDURE — 93880 EXTRACRANIAL BILAT STUDY: CPT

## 2024-05-10 PROCEDURE — 93880 EXTRACRANIAL BILAT STUDY: CPT | Performed by: SURGERY

## 2024-05-13 ENCOUNTER — TELEPHONE (OUTPATIENT)
Age: 69
End: 2024-05-13

## 2024-05-13 NOTE — TELEPHONE ENCOUNTER
Patient returning call. Did not see notes of this. Warm transferred to [facility].

## 2024-05-16 ENCOUNTER — CONSULT (OUTPATIENT)
Dept: VASCULAR SURGERY | Facility: CLINIC | Age: 69
End: 2024-05-16
Payer: COMMERCIAL

## 2024-05-16 DIAGNOSIS — I71.40 ABDOMINAL ANEURYSM (HCC): Primary | ICD-10-CM

## 2024-05-16 DIAGNOSIS — I71.43 INFRARENAL ABDOMINAL AORTIC ANEURYSM (AAA) WITHOUT RUPTURE (HCC): ICD-10-CM

## 2024-05-16 DIAGNOSIS — I65.23 CAROTID ARTERY PLAQUE, BILATERAL: ICD-10-CM

## 2024-05-16 PROCEDURE — 99203 OFFICE O/P NEW LOW 30 MIN: CPT | Performed by: NURSE PRACTITIONER

## 2024-05-16 NOTE — PATIENT INSTRUCTIONS
-Complete Abdominal and lower extremity ultrasound and return to the office for review with a surgeon.    -Continue to take aspirin 81mg daily.    -[employer] is the name of the company for the Abdominal Aortic Aneurysm repair that is the trial being done here at [facility].    - www.[employer] is the website.     -Dr.Joseph Porras- Family Doctor  [address], [place], [address] [phone]

## 2024-05-16 NOTE — ASSESSMENT & PLAN NOTE
5/10/24 Carotid ultrasound reviewed   R ICA 74/28 with ratio 0.81  L ICA 69/23 with ratio 0.72  L mid /55 with ratio 1.58    -Denies symptoms of numbness/ tingling/ weakness on one side of the body, facial droop, slurred speech or blindness in one eye.    -Reviewed most recent carotid ultrasound results in detail with pt. Discussed pathophysiology of arterial disease and indication for vascular intervention. No vascular intervention planned at this time. Discussed that we will continue with medical management and non-invasive imaging at this time.     Plan  -Complete carotid ultrasound in one year and return to the office for review.  -Continue Aspirin 81mg daily.  -Declined statin therapy at this time, states controlled with diet and exercise.  -Call 911/ Go to the ER with any S/S of TIA/ CVA.

## 2024-05-16 NOTE — ASSESSMENT & PLAN NOTE
CTA from 11/10/23 reviewed demonstrates aneurysm of the infrarenal abdominal aorta measuring 36 x 38 mm.     -Denies abdominal pain, abdominal mass.  -No self-limiting claudication, no rest pain, no wounds/ tissue loss.      -Reviewed CTA in detail with pt and answered all questions. Discussed pathophysiology of arterial aneurysm, indications for treatment and conservative treatment measures. Criteria for intervention including size>=5.5 cm, growth >0.5cm in 6 months.   -Will continue with non-invasive surveillance every 6 months and medical management with ASA and atorvastatin. Pt verbalized understanding and is agreeable to the plan at this time.   -Discussed we will perform one time LEAD to monitor for popliteal aneurysm.      Recommendations  -Complete AOIL and LEAD and return to the office for review with a vascular surgeon.   -Continue taking aspirin 81 mg daily.  -BP control.   -Pt is not open to taking any statin therapy at this time.  -Go to the ED/ Call 911 with any s/s of ruptured AAA.  -Call the office with any questions or concerns, new or worsening symptoms. Call the office for increase in leg pain, swelling, discoloration, new wounds.

## 2024-05-16 NOTE — PROGRESS NOTES
Ambulatory Visit  Name: Raul Feliz      : 1955      MRN: [id number]  Encounter Provider: REYNA Feliciano  Encounter Date: 2024   Encounter department: [facility] VASCULAR [facility]    Assessment & Plan   1. Abdominal aneurysm (HCC)  -     VAS abdominal aorta/iliac duplex; Future; Expected date: 2024  -     Ambulatory Referral to Vascular Surgery  -     VAS ARTERIAL DUPLEX- LOWER LIMB BILATERAL; Future; Expected date: 2024  2. Carotid artery plaque, bilateral  Assessment & Plan:  5/10/24 Carotid ultrasound reviewed   R ICA 74/28 with ratio 0.81  L ICA 69/23 with ratio 0.72  L mid /55 with ratio 1.58    -Denies symptoms of numbness/ tingling/ weakness on one side of the body, facial droop, slurred speech or blindness in one eye.    -Reviewed most recent carotid ultrasound results in detail with pt. Discussed pathophysiology of arterial disease and indication for vascular intervention. No vascular intervention planned at this time. Discussed that we will continue with medical management and non-invasive imaging at this time.     Plan  -Complete carotid ultrasound in one year and return to the office for review.  -Continue Aspirin 81mg daily.  -Declined statin therapy at this time, states controlled with diet and exercise.  -Call 911/ Go to the ER with any S/S of TIA/ CVA.  Orders:  -     VAS carotid complete study; Future; Expected date: 2025  3. Infrarenal abdominal aortic aneurysm (AAA) without rupture (HCC)  Assessment & Plan:  CTA from 11/10/23 reviewed demonstrates aneurysm of the infrarenal abdominal aorta measuring 36 x 38 mm.     -Denies abdominal pain, abdominal mass.  -No self-limiting claudication, no rest pain, no wounds/ tissue loss.      -Reviewed CTA in detail with pt and answered all questions. Discussed pathophysiology of arterial aneurysm, indications for treatment and conservative treatment measures. Criteria for intervention including  size>=5.5 cm, growth >0.5cm in 6 months.   -Will continue with non-invasive surveillance every 6 months and medical management with ASA and atorvastatin. Pt verbalized understanding and is agreeable to the plan at this time.   -Discussed we will perform one time LEAD to monitor for popliteal aneurysm.      Recommendations  -Complete AOIL and LEAD and return to the office for review with a vascular surgeon.   -Continue taking aspirin 81 mg daily.  -BP control.   -Pt is not open to taking any statin therapy at this time.  -Go to the ED/ Call 911 with any s/s of ruptured AAA.  -Call the office with any questions or concerns, new or worsening symptoms. Call the office for increase in leg pain, swelling, discoloration, new wounds.       History of Present Illness     Raul Feliz is a 68 y.o. male who presents a past medical history of abdominal aneurysm, liver hemangioma, B/L carotid stenosis, hiatal hernia, liver cyst, lipoma of torso, HLD, positive XIMENA is a new consult to the office for AAA and review of carotid ultrasound.   Pt presents to the office verbalizing multiple concerns over newly found/ recent incidental finding of AAA on MRI. He had concerns over sudden aneurysm with no finding present on CT prior. However, addendum is noted that AAA is present on CTA prior to MRI. Discussed clinical trial and recommended f/u with Dr.lynne Trent for more information.   He is very active and exercises daily. He has multiple concerns about the CT and MRI that was recently      Review of Systems   Respiratory:  Negative for shortness of breath.    Cardiovascular:  Negative for chest pain.     Pertinent Medical History      Medical History Reviewed by provider this encounter:  Tobacco  Allergies  Meds  Problems  Med Hx  Surg Hx  Fam Hx       Past Medical History   Past Medical History:   Diagnosis Date    Ganglion     Hiatal hernia     Hypogonadism in male     Lump or mass in breast      Past Surgical History:    Procedure Laterality Date    BREAST BIOPSY      AK EXC LESION TENDON SHEATH/CAPSULE W/SYNVCT FOOT Left 12/2/2016    Procedure: EXCISION FIBROMA;  Surgeon: Foreign Gallo DPM;  Location: [facility];  Service: Podiatry     Family History   Problem Relation Age of Onset    Dementia Mother     COPD Mother     Hypertension Mother     Coronary artery disease Father     COPD Father     Hypertension Father     Lung cancer Sister      Current Outpatient Medications on File Prior to Visit   Medication Sig Dispense Refill    cholecalciferol (VITAMIN D3) 1,000 units tablet Take 1,000 Units by mouth daily      co-enzyme Q-10 30 MG capsule Take 30 mg by mouth 3 (three) times a day      Magnesium Gluconate 550 MG TABS Take 30 mg by mouth daily      Multiple Vitamin (MULTIVITAMIN) capsule Take 1 capsule by mouth daily (Patient not taking: Reported on 4/23/2024)      Na Sulfate-K Sulfate-Mg Sulf 17.5-3.13-1.6 GM/177ML SOLN  (Patient not taking: Reported on 4/23/2024)      Testosterone 12.5 MG/ACT (1%) GEL Place 12.5 mg/day on the skin daily 1 pump on each arm daily (Patient taking differently: Place 12.5 mg/day on the skin daily 1 pump on each arm daily    Pt is stopping temporarily) 360 g 2    testosterone cypionate (DEPO-TESTOSTERONE) 200 mg/mL SOLN Inject 1 mL (200 mg total) into a muscle every 5 (five) days (Patient taking differently: Inject 200 mg into a muscle every 5 (five) days Pt is stopping temporarily) 10 mL 3     No current facility-administered medications on file prior to visit.   No Known Allergies   Current Outpatient Medications on File Prior to Visit   Medication Sig Dispense Refill    cholecalciferol (VITAMIN D3) 1,000 units tablet Take 1,000 Units by mouth daily      co-enzyme Q-10 30 MG capsule Take 30 mg by mouth 3 (three) times a day      Magnesium Gluconate 550 MG TABS Take 30 mg by mouth daily      Multiple Vitamin (MULTIVITAMIN) capsule Take 1 capsule by mouth daily (Patient not taking: Reported on  2024)      Na Sulfate-K Sulfate-Mg Sulf 17.5-3.13-1.6 GM/177ML SOLN  (Patient not taking: Reported on 2024)      Testosterone 12.5 MG/ACT (1%) GEL Place 12.5 mg/day on the skin daily 1 pump on each arm daily (Patient taking differently: Place 12.5 mg/day on the skin daily 1 pump on each arm daily    Pt is stopping temporarily) 360 g 2    testosterone cypionate (DEPO-TESTOSTERONE) 200 mg/mL SOLN Inject 1 mL (200 mg total) into a muscle every 5 (five) days (Patient taking differently: Inject 200 mg into a muscle every 5 (five) days Pt is stopping temporarily) 10 mL 3     No current facility-administered medications on file prior to visit.      Social History     Tobacco Use    Smoking status: Former     Current packs/day: 0.00     Average packs/day: 0.5 packs/day for 20.0 years (10.0 ttl pk-yrs)     Types: Cigarettes     Start date:      Quit date:      Years since quittin.3    Smokeless tobacco: Never    Tobacco comments:     socially   Vaping Use    Vaping status: Never Used   Substance and Sexual Activity    Alcohol use: Not Currently     Comment: Rare     Drug use: Never    Sexual activity: Not on file     Objective     There were no vitals taken for this visit.    Physical Exam  Constitutional:       General: He is not in acute distress.     Appearance: Normal appearance. He is not ill-appearing.   HENT:      Head: Normocephalic and atraumatic.   Cardiovascular:      Rate and Rhythm: Normal rate and regular rhythm.      Pulses: Normal pulses.      Heart sounds: Normal heart sounds. No murmur heard.  Pulmonary:      Effort: Pulmonary effort is normal. No respiratory distress.      Breath sounds: Normal breath sounds.   Skin:     General: Skin is warm and dry.   Neurological:      General: No focal deficit present.      Mental Status: He is alert and oriented to person, place, and time.      Sensory: No sensory deficit.      Motor: No weakness.      Gait: Gait normal.   Psychiatric:          Mood and Affect: Mood normal.         Behavior: Behavior normal.       Administrative Statements   I have spent a total time of 30 minutes on 05/16/24 In caring for this patient including Diagnostic results, Risks and benefits of tx options, Instructions for management, Patient and family education, Importance of tx compliance, Risk factor reductions, Documenting in the medical record, Reviewing / ordering tests, medicine, procedures  , and Obtaining or reviewing history  .

## 2024-06-27 ENCOUNTER — TELEPHONE (OUTPATIENT)
Dept: VASCULAR SURGERY | Facility: CLINIC | Age: 69
End: 2024-06-27

## 2024-06-27 NOTE — TELEPHONE ENCOUNTER
I lmom to offer pt 6/28 at 9:30am with [facility] in [place]. If pt calls back and the spot isn't filled please transfer to me, if the spot has already been filled then please inform the pt it is no longer available.

## 2024-07-10 ENCOUNTER — RA CDI HCC (OUTPATIENT)
Dept: OTHER | Facility: HOSPITAL | Age: 69
End: 2024-07-10

## 2024-07-18 ENCOUNTER — RA CDI HCC (OUTPATIENT)
Dept: OTHER | Facility: HOSPITAL | Age: 69
End: 2024-07-18

## 2024-07-19 ENCOUNTER — OFFICE VISIT (OUTPATIENT)
Dept: FAMILY MEDICINE CLINIC | Facility: CLINIC | Age: 69
End: 2024-07-19
Payer: COMMERCIAL

## 2024-07-19 VITALS
WEIGHT: 167.2 LBS | OXYGEN SATURATION: 99 % | BODY MASS INDEX: 22.65 KG/M2 | TEMPERATURE: 97.5 F | HEIGHT: 72 IN | RESPIRATION RATE: 16 BRPM | DIASTOLIC BLOOD PRESSURE: 72 MMHG | HEART RATE: 57 BPM | SYSTOLIC BLOOD PRESSURE: 123 MMHG

## 2024-07-19 DIAGNOSIS — R79.89 LOW TESTOSTERONE: ICD-10-CM

## 2024-07-19 DIAGNOSIS — Z00.00 ANNUAL PHYSICAL EXAM: Primary | ICD-10-CM

## 2024-07-19 DIAGNOSIS — E78.2 MIXED HYPERLIPIDEMIA: ICD-10-CM

## 2024-07-19 DIAGNOSIS — R76.8 POSITIVE ANA (ANTINUCLEAR ANTIBODY): ICD-10-CM

## 2024-07-19 PROCEDURE — 99397 PER PM REEVAL EST PAT 65+ YR: CPT | Performed by: FAMILY MEDICINE

## 2024-07-22 PROBLEM — R79.89 LOW TESTOSTERONE: Status: ACTIVE | Noted: 2024-07-22

## 2024-07-22 PROBLEM — E78.2 MODERATE MIXED HYPERLIPIDEMIA NOT REQUIRING STATIN THERAPY: Status: RESOLVED | Noted: 2022-04-26 | Resolved: 2024-07-22

## 2024-07-22 NOTE — PROGRESS NOTES
Adult Annual Physical  Name: Raul Feliz      : 1955      MRN: [id number]  Encounter Provider: Lakesha Zelaya MD  Encounter Date: 2024   Encounter department: [facility]    Assessment & Plan   1. Annual physical exam  2. Positive XIMENA (antinuclear antibody)  3. Mixed hyperlipidemia  4. Low testosterone        Regarding his annual physical, patient was given age and diagnosis appropriate evaluation and care.  Patient's blood work from March was also discussed with patient.  Patient will follow-up with me next year for his Medicare annual well visit.  Patient advised that he can see me prior to that for anything else in between.  Regarding his positive XIMENA history, discussed with patient.  Patient did have a titer of 1 in 1280.  Patient did see a rheumatologist.  And per patient rheumatologist did not diagnosed with anything specific.  Patient also denies any symptoms from his positive XIMENA.  Will continue to monitor and will continue to evaluate and monitor his symptoms as well if he gets any.  Patient denied a colonoscopy as well as screening for hepatitis C.  As for vaccines patient says he will get them when they are due.  Regarding his hyperlipidemia patient's FLP was mildly elevated.  Patient advised to have less fats.  Diet and exercise.  Will recheck it again in a year.  Regarding his low testosterone history, discussed with patient.  Patient did see cardiologist and had a negative workup.  Patient continues to supplement.  And as discussed with patient patient will call me on Monday for his to for his next dose and he will specify his dose and how he takes it as well.  Again as discussed with patient.  Patient denies any acute cardiovascular neuro or pulmonary symptoms from his testosterone supplementation.  Patient education.  Patient advised well hydration.  RTO 1 year for his Medicare annual well visit.  Patient can see me prior to that for anything else in  between.          Immunizations and preventive care screenings were discussed with patient today. Appropriate education was printed on patient's after visit summary.        Counseling:  Alcohol/drug use: discussed moderation in alcohol intake, the recommendations for healthy alcohol use, and avoidance of illicit drug use.  Dental Health: discussed importance of regular tooth brushing, flossing, and dental visits.  Injury prevention: discussed safety/seat belts, safety helmets, smoke detectors, carbon dioxide detectors, and smoking near bedding or upholstery.  Sexual health: discussed sexually transmitted diseases, partner selection, use of condoms, avoidance of unintended pregnancy, and contraceptive alternatives.  Exercise: the importance of regular exercise/physical activity was discussed. Recommend exercise 3-5 times per week for at least 30 minutes.          History of Present Illness     Adult Annual Physical:  Patient presents for annual physical. 69-year-old male here for his annual physical today.  Patient also did blood work and urine back in March.  Patient has since seen a rheumatologist and a cardiologist.  Patient also gets testosterone for his low testosterone history.  Patient asserts now that back then he was under a lot of stress and he is feeling much better since then.  And all this time patient did lose his mother and says that he is doing okay.  Patient did not go for any formal counseling because he said he does not need it.  Patient denies SI HI.  Patient denies tobacco as well..     Diet and Physical Activity:  - Diet/Nutrition: well balanced diet.  - Exercise: 3-4 times a week on average.    Depression Screening:  - PHQ-2 Score: 0    General Health:  - Sleep: sleeps well.  - Hearing: normal hearing bilateral ears.  - Vision: no vision problems.  - Dental: regular dental visits.     Health:  - History of STDs: no.   - Urinary symptoms: none.     Advanced Care Planning:  - Has an advanced  directive?: no    - Has a durable medical POA?: no    - ACP document given to patient?: no      Review of Systems   Constitutional:  Negative for activity change, appetite change, chills, fatigue, fever and unexpected weight change.   HENT:  Positive for hearing loss. Negative for congestion and sore throat.         Uses hearing aids.  Not new.   Eyes:  Negative for visual disturbance.   Respiratory:  Negative for cough and shortness of breath.    Cardiovascular:  Negative for chest pain and palpitations.   Gastrointestinal:  Negative for abdominal pain, blood in stool, constipation, diarrhea, nausea and vomiting.   Genitourinary:  Negative for dysuria and frequency.   Musculoskeletal:  Negative for arthralgias.   Skin:  Negative for rash.   Neurological:  Negative for dizziness and headaches.   Psychiatric/Behavioral:  Negative for dysphoric mood, self-injury and suicidal ideas. The patient is not nervous/anxious.          Objective     /72 (BP Location: Left arm, Patient Position: Sitting, Cuff Size: Adult)   Pulse 57   Temp 97.5 °F (36.4 °C) (Temporal)   Resp 16   Ht 6' (1.829 m)   Wt 75.8 kg (167 lb 3.2 oz)   SpO2 99%   BMI 22.68 kg/m²     Physical Exam  Vitals reviewed.   Constitutional:       General: He is not in acute distress.     Appearance: Normal appearance. He is normal weight. He is not ill-appearing.   HENT:      Head: Normocephalic and atraumatic.      Right Ear: Tympanic membrane, ear canal and external ear normal.      Left Ear: Tympanic membrane, ear canal and external ear normal.      Mouth/Throat:      Mouth: Mucous membranes are moist.      Pharynx: Oropharynx is clear.   Eyes:      Extraocular Movements: Extraocular movements intact.      Conjunctiva/sclera: Conjunctivae normal.   Neck:      Vascular: No carotid bruit.   Cardiovascular:      Rate and Rhythm: Normal rate and regular rhythm.   Pulmonary:      Effort: Pulmonary effort is normal.      Breath sounds: Normal breath  sounds.   Abdominal:      General: Bowel sounds are normal.      Palpations: Abdomen is soft.      Tenderness: There is no abdominal tenderness. There is no right CVA tenderness or left CVA tenderness.   Musculoskeletal:         General: No tenderness.      Right lower leg: No edema.      Left lower leg: No edema.      Comments: No calf tenderness bilateral.   Lymphadenopathy:      Cervical: No cervical adenopathy.   Skin:     General: Skin is warm.      Findings: No rash.   Neurological:      General: No focal deficit present.      Mental Status: He is alert and oriented to person, place, and time.   Psychiatric:         Mood and Affect: Mood normal.         Behavior: Behavior normal.         Thought Content: Thought content normal.      Comments: The recent passing of his mother was also discussed with patient.  Patient briefly counseled.  No SI HI.

## 2024-07-22 NOTE — PATIENT INSTRUCTIONS
"Patient Education     Routine physical for adults   The Basics   Written by the doctors and editors at [facility]   What is a physical? -- A physical is a routine visit, or \"check-up,\" with your doctor. You might also hear it called a \"wellness visit\" or \"preventive visit.\"  During each visit, the doctor will:   Ask about your physical and mental health   Ask about your habits, behaviors, and lifestyle   Do an exam   Give you vaccines if needed   Talk to you about any medicines you take   Give advice about your health   Answer your questions  Getting regular check-ups is an important part of taking care of your health. It can help your doctor find and treat any problems you have. But it's also important for preventing health problems.  A routine physical is different from a \"sick visit.\" A sick visit is when you see a doctor because of a health concern or problem. Since physicals are scheduled ahead of time, you can think about what you want to ask the doctor.  How often should I get a physical? -- It depends on your age and health. In general, for people age 21 years and older:   If you are younger than 50 years, you might be able to get a physical every 3 years.   If you are 50 years or older, your doctor might recommend a physical every year.  If you have an ongoing health condition, like diabetes or high blood pressure, your doctor will probably want to see you more often.  What happens during a physical? -- In general, each visit will include:   Physical exam - The doctor or nurse will check your height, weight, heart rate, and blood pressure. They will also look at your eyes and ears. They will ask about how you are feeling and whether you have any symptoms that bother you.   Medicines - It's a good idea to bring a list of all the medicines you take to each doctor visit. Your doctor will talk to you about your medicines and answer any questions. Tell them if you are having any side effects that bother you. You " "should also tell them if you are having trouble paying for any of your medicines.   Habits and behaviors - This includes:   Your diet   Your exercise habits   Whether you smoke, drink alcohol, or use drugs   Whether you are sexually active   Whether you feel safe at home  Your doctor will talk to you about things you can do to improve your health and lower your risk of health problems. They will also offer help and support. For example, if you want to quit smoking, they can give you advice and might prescribe medicines. If you want to improve your diet or get more physical activity, they can help you with this, too.   Lab tests, if needed - The tests you get will depend on your age and situation. For example, your doctor might want to check your:   Cholesterol   Blood sugar   Iron level   Vaccines - The recommended vaccines will depend on your age, health, and what vaccines you already had. Vaccines are very important because they can prevent certain serious or deadly infections.   Discussion of screening - \"Screening\" means checking for diseases or other health problems before they cause symptoms. Your doctor can recommend screening based on your age, risk, and preferences. This might include tests to check for:   Cancer, such as breast, prostate, cervical, ovarian, colorectal, prostate, lung, or skin cancer   Sexually transmitted infections, such as chlamydia and gonorrhea   Mental health conditions like depression and anxiety  Your doctor will talk to you about the different types of screening tests. They can help you decide which screenings to have. They can also explain what the results might mean.   Answering questions - The physical is a good time to ask the doctor or nurse questions about your health. If needed, they can refer you to other doctors or specialists, too.  Adults older than 65 years often need other care, too. As you get older, your doctor will talk to you about:   How to prevent falling at " home   Hearing or vision tests   Memory testing   How to take your medicines safely   Making sure that you have the help and support you need at home  All topics are updated as new evidence becomes available and our peer review process is complete.  This topic retrieved from [employer] on: May 02, 2024.  Topic 301039 Version 1.0  Release: 32.4.3 - C32.122  © 2024 UpToDate, Inc. and/or its affiliates. All rights reserved.  Consumer Information Use and Disclaimer   Disclaimer: This generalized information is a limited summary of diagnosis, treatment, and/or medication information. It is not meant to be comprehensive and should be used as a tool to help the user understand and/or assess potential diagnostic and treatment options. It does NOT include all information about conditions, treatments, medications, side effects, or risks that may apply to a specific patient. It is not intended to be medical advice or a substitute for the medical advice, diagnosis, or treatment of a health care provider based on the health care provider's examination and assessment of a patient's specific and unique circumstances. Patients must speak with a health care provider for complete information about their health, medical questions, and treatment options, including any risks or benefits regarding use of medications. This information does not endorse any treatments or medications as safe, effective, or approved for treating a specific patient. UpToDate, Inc. and its affiliates disclaim any warranty or liability relating to this information or the use thereof.The use of this information is governed by the Terms of Use, available at https://www.woltersWe Tributeuwer.com/en/know/clinical-effectiveness-terms. 2024© UpToDate, Inc. and its affiliates and/or licensors. All rights reserved.  Copyright   © 2024 UpToDate, Inc. and/or its affiliates. All rights reserved.

## 2024-10-04 ENCOUNTER — TELEPHONE (OUTPATIENT)
Age: 69
End: 2024-10-04

## 2024-10-04 NOTE — TELEPHONE ENCOUNTER
Pharmacy called the RX Refill Line. Message is being forwarded to the office.     Pharmacy is requesting a refill of testosterone cypionate (DEPO-TESTOSTERONE) 200 mg/mL SOLN. Would like that sent to [address]. Not on active med list to que up.        Please contact pharmacy at [phone] if any issues

## 2024-10-07 ENCOUNTER — TELEPHONE (OUTPATIENT)
Age: 69
End: 2024-10-07

## 2024-10-07 NOTE — TELEPHONE ENCOUNTER
Patient called the RX Refill Line. Message is being forwarded to the office.     Patient is requesting a new script for testosterone cypionate. States Dr changed how he takes the medication. Instead of 5 days a week he now takes it 7 times a week. But the prescription has been canceled.     States he was suppose to take the medication this past Friday.  He is out of medication    Hina Morris    Please contact patient at [phone]

## 2024-10-08 ENCOUNTER — TELEPHONE (OUTPATIENT)
Dept: FAMILY MEDICINE CLINIC | Facility: CLINIC | Age: 69
End: 2024-10-08

## 2024-10-08 NOTE — TELEPHONE ENCOUNTER
Patient called very upset that his medication was not sent in yet. Patient wanted to speak to someone in Dr. Zelaya office. I transferred him to Daya.

## 2024-10-08 NOTE — TELEPHONE ENCOUNTER
Per patient during last visit we were to send Rx over to [facility] with new instructions For the depo-testosterone 200mg/ml soln  Once a week instead of every 5 days  Patient is waiting for the refill with new directions.    I don't see it on current med list

## 2024-10-09 ENCOUNTER — TELEPHONE (OUTPATIENT)
Dept: FAMILY MEDICINE CLINIC | Facility: CLINIC | Age: 69
End: 2024-10-09

## 2024-10-09 DIAGNOSIS — E29.1 HYPOGONADISM IN MALE: Primary | ICD-10-CM

## 2024-10-09 RX ORDER — TESTOSTERONE CYPIONATE 200 MG/ML
50 INJECTION, SOLUTION INTRAMUSCULAR WEEKLY
Qty: 3 ML | Refills: 0 | Status: SHIPPED | OUTPATIENT
Start: 2024-10-09 | End: 2024-10-10 | Stop reason: SDUPTHER

## 2024-10-09 NOTE — TELEPHONE ENCOUNTER
Patient called and said there is a problem with the the prescription for the testosterone that was sent to the pharmacy.    Patient says he takes 200 mg 1 mg every week every 7 days.    He Increased it by taking it every 5 days when his testosterone decreased due to stress, but it has improved and now he's taking the 1 mg. Patient is asking if the prescription for the 1 mg can be sent to his pharmacy.

## 2024-10-09 NOTE — TELEPHONE ENCOUNTER
Patient called into rx refill line and feels like the prescription was written incorrectly. He is frustrated and upset asked to speak directly to the office. Warm transferred to the office - spoke to Jean Claude

## 2024-10-09 NOTE — TELEPHONE ENCOUNTER
Patient called back to speak to Bell but call got disconnected. Please call patient back at [phone]

## 2024-10-09 NOTE — TELEPHONE ENCOUNTER
Patient called the RX Refill Line. Message is being forwarded to the office.     Patient called back again to speak to Bell but call got disconnected. Please call patient back at [phone]  Patient is very concerned he wants to speak to Bell before anything is done about his testosterone prescription.   Please contact patient at [phone]

## 2024-10-10 DIAGNOSIS — E29.1 HYPOGONADISM IN MALE: ICD-10-CM

## 2024-10-10 RX ORDER — TESTOSTERONE CYPIONATE 200 MG/ML
200 INJECTION, SOLUTION INTRAMUSCULAR WEEKLY
Qty: 12 ML | Refills: 3 | Status: SHIPPED | OUTPATIENT
Start: 2024-10-10 | End: 2025-09-05

## 2025-05-20 DIAGNOSIS — E29.1 HYPOGONADISM IN MALE: ICD-10-CM

## 2025-05-20 RX ORDER — TESTOSTERONE CYPIONATE 200 MG/ML
200 INJECTION, SOLUTION INTRAMUSCULAR WEEKLY
Qty: 12 ML | Refills: 0 | Status: SHIPPED | OUTPATIENT
Start: 2025-05-20 | End: 2026-04-15

## 2025-05-20 NOTE — TELEPHONE ENCOUNTER
Reason for call:   [x] Refill   [] Prior Auth  [] Other:     Office:   [x] PCP/Provider - Lakesha Zelaya MD   [] Specialty/Provider -     Medication: Depo-Testosterone) 200 mg     Dose/Frequency: 1 ml once a week    Quantity: 12 ml    Pharmacy:   [id number] [place] [place] [facility] [address] [facility]   Does the patient have enough for 3 days?   [x] Yes   [] No - Send as HP to POD    Mail Away Pharmacy   Does the patient have enough for 10 days?   [] Yes   [] No - Send as HP to POD

## 2025-07-16 ENCOUNTER — RA CDI HCC (OUTPATIENT)
Dept: OTHER | Facility: HOSPITAL | Age: 70
End: 2025-07-16

## 2025-07-25 ENCOUNTER — OFFICE VISIT (OUTPATIENT)
Dept: FAMILY MEDICINE CLINIC | Facility: CLINIC | Age: 70
End: 2025-07-25
Payer: COMMERCIAL

## 2025-07-25 VITALS
HEART RATE: 62 BPM | HEIGHT: 72 IN | SYSTOLIC BLOOD PRESSURE: 126 MMHG | DIASTOLIC BLOOD PRESSURE: 76 MMHG | BODY MASS INDEX: 23.7 KG/M2 | TEMPERATURE: 98.6 F | OXYGEN SATURATION: 99 % | WEIGHT: 175 LBS | RESPIRATION RATE: 14 BRPM

## 2025-07-25 DIAGNOSIS — I71.40 ABDOMINAL ANEURYSM (HCC): ICD-10-CM

## 2025-07-25 DIAGNOSIS — K76.89 LIVER CYST: ICD-10-CM

## 2025-07-25 DIAGNOSIS — R76.8 POSITIVE ANA (ANTINUCLEAR ANTIBODY): ICD-10-CM

## 2025-07-25 DIAGNOSIS — R35.1 NOCTURIA: ICD-10-CM

## 2025-07-25 DIAGNOSIS — Z13.1 SCREENING FOR DIABETES MELLITUS: ICD-10-CM

## 2025-07-25 DIAGNOSIS — E78.2 MIXED HYPERLIPIDEMIA: ICD-10-CM

## 2025-07-25 DIAGNOSIS — Z00.00 MEDICARE ANNUAL WELLNESS VISIT, SUBSEQUENT: Primary | ICD-10-CM

## 2025-07-25 DIAGNOSIS — E29.1 HYPOGONADISM IN MALE: ICD-10-CM

## 2025-07-25 DIAGNOSIS — Z12.5 SCREENING FOR PROSTATE CANCER: ICD-10-CM

## 2025-07-25 DIAGNOSIS — L98.9 SKIN LESIONS: ICD-10-CM

## 2025-07-25 DIAGNOSIS — Z12.11 SCREENING FOR COLON CANCER: ICD-10-CM

## 2025-07-25 DIAGNOSIS — D18.03 LIVER HEMANGIOMA: ICD-10-CM

## 2025-07-25 PROCEDURE — G2211 COMPLEX E/M VISIT ADD ON: HCPCS | Performed by: FAMILY MEDICINE

## 2025-07-25 PROCEDURE — 99214 OFFICE O/P EST MOD 30 MIN: CPT | Performed by: FAMILY MEDICINE

## 2025-07-25 PROCEDURE — G0439 PPPS, SUBSEQ VISIT: HCPCS | Performed by: FAMILY MEDICINE

## 2025-07-25 RX ORDER — TESTOSTERONE CYPIONATE 200 MG/ML
200 INJECTION, SOLUTION INTRAMUSCULAR WEEKLY
Qty: 12 ML | Refills: 0 | Status: SHIPPED | OUTPATIENT
Start: 2025-07-25 | End: 2025-07-25 | Stop reason: SDUPTHER

## 2025-07-25 RX ORDER — TESTOSTERONE CYPIONATE 200 MG/ML
200 INJECTION, SOLUTION INTRAMUSCULAR WEEKLY
Qty: 12 ML | Refills: 1 | Status: SHIPPED | OUTPATIENT
Start: 2025-07-25 | End: 2026-06-20

## 2025-07-25 NOTE — ASSESSMENT & PLAN NOTE
Discussed with patient.  Patient advised less fats, less starches and sweets.  Exercise.  Continue current therapy.  Check labs below.  Orders:  •  Comprehensive metabolic panel; Future  •  Lipid Panel with Direct LDL reflex; Future

## 2025-07-25 NOTE — ASSESSMENT & PLAN NOTE
"Not new.  Stable on his testosterone.  His testosterone is refilled as per his request.  Patient denies any cardiovascular or neuro symptoms.  Patient also sees urology.  Follow-up with urology as scheduled.  Will get his labs below done which also includes his free and total testosterone level as per patient's request.  And the 2 different needles that he requested were also sent to the pharmacy, [facility], as per his request.  Orders:  •  Comprehensive metabolic panel; Future  •  PSA, total and free; Future  •  TSH, 3rd generation with Free T4 reflex; Future  •  Testosterone, free, total; Future  •  SYRINGE-NEEDLE, DISP, 3 ML 21G X 1\" 3 ML MISC; Use once a week  •  SYRINGE-NEEDLE, DISP, 3 ML 22G X 1\" 3 ML MISC; Use once a week  •  testosterone cypionate (Depo-Testosterone) 200 mg/mL SOLN; Inject 1 mL (200 mg total) into a muscle once a week for 48 doses  "

## 2025-07-25 NOTE — ASSESSMENT & PLAN NOTE
Per his history.  Discussed with patient.  Patient is ordered the abdominal sonogram below.  Patient has had an MRI for this already.  Orders:  •  US abdomen complete; Future

## 2025-07-25 NOTE — PROGRESS NOTES
"Name: Raul Feliz      : 1955      MRN: [id number]  Encounter Provider: Lakesha Zelaya MD  Encounter Date: 2025   Encounter department: [facility]  :  Assessment & Plan  Medicare annual wellness visit, subsequent  Regarding his Medicare annual well visit, patient is given age and diagnosis appropriate evaluation and care.  Patient is ordered the labs below as discussed with patient which also includes a PSA and an A1c as discussed with patient and with his permission.  Patient advised to see GI for his colonoscopy.  Follow-up with his urologist and cardiologist as scheduled.  Also see his dermatologist for total-body skin cancer screening as well as the lesions that he mentioned below.  Take multivitamin.  Take vitamin D3 2000 units daily.  Exercise.  Hydrate well.  Advise routine self skin checks and use of sunscreen.  Orders:  •  CBC and differential; Future  •  Comprehensive metabolic panel; Future  •  Lipid Panel with Direct LDL reflex; Future  •  UA w Reflex to Microscopic w Reflex to Culture; Future    Hypogonadism in male  Not new.  Stable on his testosterone.  His testosterone is refilled as per his request.  Patient denies any cardiovascular or neuro symptoms.  Patient also sees urology.  Follow-up with urology as scheduled.  Will get his labs below done which also includes his free and total testosterone level as per patient's request.  And the 2 different needles that he requested were also sent to the pharmacy, [facility], as per his request.  Orders:  •  Comprehensive metabolic panel; Future  •  PSA, total and free; Future  •  TSH, 3rd generation with Free T4 reflex; Future  •  Testosterone, free, total; Future  •  SYRINGE-NEEDLE, DISP, 3 ML 21G X 1\" 3 ML MISC; Use once a week  •  SYRINGE-NEEDLE, DISP, 3 ML 22G X 1\" 3 ML MISC; Use once a week  •  testosterone cypionate (Depo-Testosterone) 200 mg/mL SOLN; Inject 1 mL (200 mg total) into a muscle once a week " for 48 doses    Mixed hyperlipidemia  Discussed with patient.  Patient advised less fats, less starches and sweets.  Exercise.  Continue current therapy.  Check labs below.  Orders:  •  Comprehensive metabolic panel; Future  •  Lipid Panel with Direct LDL reflex; Future    Positive XIMENA (antinuclear antibody)  Per his history.  Patient has seen rheumatology for.  Per patient, rheumatology's workup was negative.  Will follow-up with rheumatology as scheduled and as per patient.       Abdominal aneurysm (HCC)  Asymptomatic.  Discussed with patient.  Patient has seen vascular surgery as well.  Patient has an order to recheck this with the radiology study.  Discussed with patient, but patient asserts that his cardiologist told him that he does not have to do it as of yet.  Patient appropriately advised.       Liver hemangioma  Per his history.  Discussed with patient.  Patient is ordered the abdominal sonogram below.  Patient has had an MRI for this already.  Orders:  •  US abdomen complete; Future    Liver cyst  Per his history.  Discussed with patient.  Patient is ordered the abdominal sonogram below.  Orders:  •  US abdomen complete; Future    Skin lesions  Left lower leg below the knee laterally with 2 small pencil eraser size slightly hyperpigmented and slightly raised lesions.  Per patient they are not new.  And he has also addressed them with his PCP as per patient.  Will offered to remove them but patient declined, again as per patient.    Discussed with patient.  Patient given referral to dermatology as well.    Orders:  •  Ambulatory Referral to Dermatology; Future    Nocturia  Not acute or new.  Discussed with patient.  Patient will get his PSA checked with his next blood work.  Follow-up with urology as scheduled.  Orders:  •  PSA, total and free; Future    Screening for diabetes mellitus  Discussed with patient.  Orders:  •  Hemoglobin A1C; Future    Screening for colon cancer  Discussed with  patient.  Orders:  •  Ambulatory Referral to Gastroenterology; Future    Screening for prostate cancer  Discussed with patient.         Depression Screening and Follow-up Plan: Patient was screened for depression during today's encounter. They screened negative with a PHQ-2 score of 0.          RTO 1 year for his Medicare annual well visit and do the labs before.  Patient can see me prior to that for anything else in between.  (Of note, I called his [facility] pharmacy and spoke to the pharmacist Faisal myself regarding his testosterone refill.  As it is written is good for 84 days and I gave him 1 extra refill as well.  She also said that he is not due until the 31st of this month for his next refill.)        Preventive health issues were discussed with patient, and age appropriate screening tests were ordered as noted in patient's After Visit Summary. Personalized health advice and appropriate referrals for health education or preventive services given if needed, as noted in patient's After Visit Summary.    History of Present Illness     70-year-old male here for his Medicare annual well visit.  Patient is requesting a refill on his testosterone supplementation.  He is also requesting a prescription for needles of 21 and 22-gauge.  Patient denies tobacco.  Patient is due for his colonoscopy.  Patient is also due for the pneumonia vaccine, Shingrix vaccine and also recommended the RSV vaccine.  He declines to get them.  Patient is up-to-date with his Tdap.  Patient asserts that he sees cardiology and urology.  Patient has seen rheumatologist for his positive XIMENA and says that his workup was negative with the rheumatologist and that the rheumatologist told him that he has nothing.  He has also seen vascular surgery who ordered him vascular studies and he has not done yet.  Per patient, he says that his cardiologist told him he does not need to do them as of yet.  And patient does complain of fatigue which is not acute  or new.  Patient asserts that because he may be getting old and that his kids are aware of it as well.  Patient also mentioned that he sees another PCP of his and sees another older physician from time to time.       Patient Care Team:  Lakesha Zelaya MD as PCP - General (Family Medicine)    Review of Systems   Constitutional:  Positive for fatigue. Negative for activity change, appetite change, chills, diaphoresis, fever and unexpected weight change.   HENT:  Positive for hearing loss. Negative for congestion and sore throat.         Patient has chronic hearing loss and wears hearing aids bilateral.  Today he only has 1 in his left ear.   Eyes:  Negative for visual disturbance.   Respiratory:  Negative for cough and shortness of breath.    Cardiovascular:  Negative for chest pain and palpitations.   Gastrointestinal:  Negative for abdominal pain, blood in stool, constipation, diarrhea, nausea and vomiting.   Genitourinary:  Negative for dysuria and hematuria.   Musculoskeletal:  Negative for arthralgias.   Skin:  Negative for rash.        Left lower leg with 2 skin lesions.  Not new.   Neurological:  Negative for dizziness and headaches.   Psychiatric/Behavioral:  Negative for decreased concentration, self-injury and suicidal ideas. The patient is nervous/anxious.      Medical History Reviewed by provider this encounter:  Tobacco  Allergies  Meds  Problems  Med Hx  Surg Hx  Fam Hx       Annual Wellness Visit Questionnaire   Raul is here for his Subsequent Wellness visit. Last Medicare Wellness visit information reviewed, patient interviewed and updates made to the record today.      Health Risk Assessment:   Patient rates overall health as very good. Patient feels that their physical health rating is same. Patient is satisfied with their life. Eyesight was rated as same. Hearing was rated as same. Patient feels that their emotional and mental health rating is same. Patients states they are never,  rarely angry. Patient states they are never, rarely unusually tired/fatigued. Pain experienced in the last 7 days has been none. Patient states that he has experienced no weight loss or gain in last 6 months.     Depression Screening:   PHQ-2 Score: 0      Fall Risk Screening:   In the past year, patient has experienced: no history of falling in past year      Home Safety:  Patient does not have trouble with stairs inside or outside of their home. Patient has working smoke alarms and has working carbon monoxide detector. Home safety hazards include: none.     Nutrition:   Current diet is Regular.     Medications:   Patient is currently taking over-the-counter supplements. OTC medications include: see medication list. Patient is able to manage medications.     Activities of Daily Living (ADLs)/Instrumental Activities of Daily Living (IADLs):   Walk and transfer into and out of bed and chair?: Yes  Dress and groom yourself?: Yes    Bathe or shower yourself?: Yes    Feed yourself? Yes  Do your laundry/housekeeping?: Yes  Manage your money, pay your bills and track your expenses?: Yes  Make your own meals?: Yes    Do your own shopping?: Yes    Previous Hospitalizations:   Any hospitalizations or ED visits within the last 12 months?: No      Advance Care Planning:   Living will: Yes    Durable POA for healthcare: Yes    Advanced directive: Yes      Comments: Discussed with patient today.    Cognitive Screening:   Provider or family/friend/caregiver concerned regarding cognition?: No    Preventive Screenings      Cardiovascular Screening:    General: Screening Not Indicated and History Lipid Disorder      Diabetes Screening:     General: Risks and Benefits Discussed    Due for: Blood Glucose      Colorectal Cancer Screening:     General: Risks and Benefits Discussed    Due for: Colonoscopy - High Risk      Prostate Cancer Screening:    General: Risks and Benefits Discussed    Due for: PSA      Abdominal Aortic Aneurysm  (AAA) Screening:    Risk factors include: age between 65-76 yo and tobacco use        General: Screening Not Indicated, History AAA and Risks and Benefits Discussed      Lung Cancer Screening:     General: Screening Not Indicated    Immunizations:  - Immunizations due: Prevnar 20, Zoster (Shingrix) and RSV  - Risks/benefits immunizations discussed    - The patient declines recommended vaccines currently despite my recommendations      Screening, Brief Intervention, and Referral to Treatment (SBIRT)     Screening      AUDIT-C Screenin) How often did you have a drink containing alcohol in the past year? never  2) How many drinks did you have on a typical day when you were drinking in the past year? 0  3) How often did you have 6 or more drinks on one occasion in the past year? never    AUDIT-C Score: 0  Interpretation: Score 0-3 (male): Negative screen for alcohol misuse    Single Item Drug Screening:  How often have you used an illegal drug (including marijuana) or a prescription medication for non-medical reasons in the past year? never    Single Item Drug Screen Score: 0  Interpretation: Negative screen for possible drug use disorder    Other Counseling Topics:   Car/seat belt/driving safety, skin self-exam, sunscreen and calcium and vitamin D intake and regular weightbearing exercise.     Social Drivers of Health     Financial Resource Strain: Low Risk  (10/3/2023)    Overall Financial Resource Strain (CARDIA)    • Difficulty of Paying Living Expenses: Not hard at all   Food Insecurity: No Food Insecurity (2025)    Nursing - Inadequate Food Risk Classification    • Worried About Running Out of Food in the Last Year: Never true    • Ran Out of Food in the Last Year: Never true   Transportation Needs: No Transportation Needs (2025)    PRAPARE - Transportation    • Lack of Transportation (Medical): No    • Lack of Transportation (Non-Medical): No   Housing Stability: Low Risk  (2025)    Housing  Stability Vital Sign    • Unable to Pay for Housing in the Last Year: No    • Number of Times Moved in the Last Year: 1    • Homeless in the Last Year: No   Utilities: Not At Risk (7/25/2025)    [facility] Utilities    • Threatened with loss of utilities: No     No results found.    Objective   /76 (BP Location: Left arm, Patient Position: Sitting, Cuff Size: Standard)   Pulse 62   Temp 98.6 °F (37 °C)   Resp 14   Ht 6' (1.829 m)   Wt 79.4 kg (175 lb)   SpO2 99%   BMI 23.73 kg/m²     Physical Exam  Vitals reviewed.   Constitutional:       General: He is not in acute distress.     Appearance: Normal appearance. He is normal weight. He is not ill-appearing.   HENT:      Head: Normocephalic and atraumatic.      Right Ear: Tympanic membrane, ear canal and external ear normal.      Left Ear: Tympanic membrane, ear canal and external ear normal.      Nose: Nose normal.      Mouth/Throat:      Mouth: Mucous membranes are moist.      Pharynx: Oropharynx is clear.     Eyes:      Extraocular Movements: Extraocular movements intact.      Conjunctiva/sclera: Conjunctivae normal.     Neck:      Vascular: No carotid bruit.     Cardiovascular:      Rate and Rhythm: Normal rate and regular rhythm.   Pulmonary:      Effort: Pulmonary effort is normal.      Breath sounds: Normal breath sounds.   Abdominal:      Palpations: Abdomen is soft.      Tenderness: There is no abdominal tenderness. There is no right CVA tenderness or left CVA tenderness.     Musculoskeletal:         General: No tenderness.      Right lower leg: No edema.      Left lower leg: No edema.      Comments: No calf tenderness bilateral.   Lymphadenopathy:      Cervical: No cervical adenopathy.     Skin:     General: Skin is warm.      Findings: Lesion present.      Comments: Left lower leg below the knee laterally with 2 small pencil eraser size slightly hyperpigmented and slightly raised lesions.  Per patient they are not new.  And he has also addressed them  with his PCP as per patient.  Will offered to remove them but patient declined, again as per patient.     Neurological:      General: No focal deficit present.      Mental Status: He is alert and oriented to person, place, and time.     Psychiatric:         Mood and Affect: Mood normal.         Behavior: Behavior normal.         Thought Content: Thought content normal.

## 2025-07-25 NOTE — ASSESSMENT & PLAN NOTE
Asymptomatic.  Discussed with patient.  Patient has seen vascular surgery as well.  Patient has an order to recheck this with the radiology study.  Discussed with patient, but patient asserts that his cardiologist told him that he does not have to do it as of yet.  Patient appropriately advised.

## 2025-07-25 NOTE — ASSESSMENT & PLAN NOTE
Per his history.  Discussed with patient.  Patient is ordered the abdominal sonogram below.  Orders:  •  US abdomen complete; Future

## 2025-07-25 NOTE — ASSESSMENT & PLAN NOTE
Per his history.  Patient has seen rheumatology for.  Per patient, rheumatology's workup was negative.  Will follow-up with rheumatology as scheduled and as per patient.

## 2025-07-28 ENCOUNTER — NURSE TRIAGE (OUTPATIENT)
Age: 70
End: 2025-07-28